# Patient Record
Sex: FEMALE | Race: BLACK OR AFRICAN AMERICAN | Employment: OTHER | URBAN - METROPOLITAN AREA
[De-identification: names, ages, dates, MRNs, and addresses within clinical notes are randomized per-mention and may not be internally consistent; named-entity substitution may affect disease eponyms.]

---

## 2017-02-10 ENCOUNTER — HOSPITAL ENCOUNTER (OUTPATIENT)
Dept: ONCOLOGY | Age: 82
Discharge: HOME | End: 2017-02-10

## 2017-02-10 ENCOUNTER — OFFICE VISIT (OUTPATIENT)
Dept: ONCOLOGY | Age: 82
End: 2017-02-10

## 2017-02-10 VITALS
HEIGHT: 63 IN | DIASTOLIC BLOOD PRESSURE: 79 MMHG | WEIGHT: 159 LBS | SYSTOLIC BLOOD PRESSURE: 162 MMHG | TEMPERATURE: 98.2 F | BODY MASS INDEX: 28.17 KG/M2 | HEART RATE: 67 BPM

## 2017-02-10 DIAGNOSIS — D47.1 CHRONIC MYELOPROLIFERATIVE DISORDER (HCC): ICD-10-CM

## 2017-02-10 DIAGNOSIS — D75.81 MYELOFIBROSIS (HCC): ICD-10-CM

## 2017-02-10 DIAGNOSIS — R53.82 CHRONIC FATIGUE: ICD-10-CM

## 2017-02-10 DIAGNOSIS — D64.9 ANEMIA, UNSPECIFIED TYPE: Primary | ICD-10-CM

## 2017-02-10 DIAGNOSIS — D64.9 ANEMIA, UNSPECIFIED TYPE: ICD-10-CM

## 2017-02-10 DIAGNOSIS — R63.0 ANOREXIA: ICD-10-CM

## 2017-02-10 LAB
BASOPHILS # BLD AUTO: 0 K/UL (ref 0–0.1)
BASOPHILS # BLD: 0 % (ref 0–2)
DIFFERENTIAL METHOD BLD: ABNORMAL
EOSINOPHIL # BLD: 0.1 K/UL (ref 0–0.4)
EOSINOPHIL NFR BLD: 2 % (ref 0–5)
ERYTHROCYTE [DISTWIDTH] IN BLOOD BY AUTOMATED COUNT: 14.8 % (ref 11.6–14.5)
HCT VFR BLD AUTO: 34.7 % (ref 35–45)
HGB BLD-MCNC: 11.5 G/DL (ref 12–16)
LYMPHOCYTES # BLD AUTO: 26 % (ref 21–52)
LYMPHOCYTES # BLD: 0.7 K/UL (ref 0.9–3.6)
MCH RBC QN AUTO: 26.3 PG (ref 24–34)
MCHC RBC AUTO-ENTMCNC: 33.1 G/DL (ref 31–37)
MCV RBC AUTO: 79.4 FL (ref 74–97)
MONOCYTES # BLD: 0.5 K/UL (ref 0.05–1.2)
MONOCYTES NFR BLD AUTO: 17 % (ref 3–10)
NEUTS SEG # BLD: 1.5 K/UL (ref 1.8–8)
NEUTS SEG NFR BLD AUTO: 55 % (ref 40–73)
PLATELET # BLD AUTO: 211 K/UL (ref 135–420)
PMV BLD AUTO: 10.6 FL (ref 9.2–11.8)
RBC # BLD AUTO: 4.37 M/UL (ref 4.2–5.3)
WBC # BLD AUTO: 2.8 K/UL (ref 4.6–13.2)

## 2017-02-10 RX ORDER — DRONABINOL 5 MG/1
5 CAPSULE ORAL 2 TIMES DAILY
Qty: 60 CAP | Refills: 3 | Status: SHIPPED | OUTPATIENT
Start: 2017-02-10

## 2017-02-10 NOTE — PROGRESS NOTES
Hematology/Oncology  Progress Note    Name: Mauro Galvan  Date: 2/10/2017  : 1935    PCP: Theresa Gracia MD     Ms. Erasto Mak is a [de-identified]year old female who was seen for management of her myelofibrosis. Current therapy: Procrit at a dose of 60,000 units subcutaneous whenever the hematocrit is below 30%. Subjective:     Mrs. Erasto Mak is a 69-year-old [facility] [place] woman who has a long-standing history of myelofibrosis. She also has slowly progressive Alzheimer's disease and is currently taking Aricept. I was informed by her daughter that her appetite has improved and she is gaining weight now. The patient is not complaining of any pain or discomfort. She reports that her bowel habits are regular. She has not noticed any blood in her urine or stool. She further denies having any shortness of breath, fever, or chills. The family is trying very hard to engage the patient with some form of regular exercise but they are continuing to meet some resistance.     Past Medical History   Diagnosis Date    Alzheimer's disease     Anemia     Anemia NEC     Cataract     Glaucoma     Heart murmur     HTN (hypertension)     Hyperlipidemia     Myelofibrosis (HCC)     OA (osteoarthritis)      lumbar spine    Osteopenia      Past Surgical History   Procedure Laterality Date    Hx hysterectomy       vaginal    Pr appendectomy       Social History     Social History    Marital status:      Spouse name: N/A    Number of children: N/A    Years of education: N/A     Occupational History    retired      Social History Main Topics    Smoking status: Never Smoker    Smokeless tobacco: Never Used    Alcohol use 0.5 oz/week     1 Glasses of wine per week      Comment: occassional    Drug use: No    Sexual activity: Not on file     Other Topics Concern    Not on file     Social History Narrative     Family History   Problem Relation Age of Onset    Cancer Mother      colon    Anemia Maternal Aunt  Osteoporosis Maternal Grandmother      Current Outpatient Prescriptions   Medication Sig Dispense Refill    iron polysacch complex-b12-fa (FERREX 150 FORTE) capsule Take 1 Cap by mouth daily. 30 Cap 2    citalopram (CELEXA) 20 mg tablet Take  by mouth daily.  ALPRAZolam (XANAX) 0.25 mg tablet Take 0.5-1 Tabs by mouth daily as needed. 30 Tab 0    citalopram (CELEXA) 10 mg tablet Take 1 Tab by mouth daily. 30 Tab 1    levothyroxine (SYNTHROID) 25 mcg tablet Take 1 Tab by mouth Daily (before breakfast). For Hypothyroidism (thyroid) 30 Tab 6    ezetimibe (ZETIA) 10 mg tablet Take 1 Tab by mouth daily. For cholesterol 90 Tab 3    donepezil (ARICEPT) 5 mg tablet Take 1 Tab by mouth nightly. For memory 90 Tab 3    amLODIPine (NORVASC) 5 mg tablet Take 1 Tab by mouth daily. 90 Tab 3    calcium-cholecalciferol, D3, (CALCIUM 600 + D) tablet Take 1 Tab by mouth daily. 90 Tab 3    multivitamin (ONE A DAY) tablet Take 1 Tab by mouth daily.  EPOETIN KAYLYN (PROCRIT IJ) by Injection route as needed. Review of Systems  Constitutional: The patient has no acute distress or discomfort. HEENT: The patient denies recent head trauma, eye pain, blurred vision,  hearing deficit, oropharyngeal mucosal pain or lesions, and the patient denies throat pain or discomfort. Lymphatics: The patient denies palpable peripheral lymphadenopathy. Hematologic: The patient denies having bruising, bleeding, or progressive fatigue. Respiratory: Patient denies having shortness of breath, cough, sputum production, fever, or dyspnea on exertion. Cardiovascular: The patient denies having leg pain, leg swelling, heart palpitations, chest permit, chest pain, or lightheadedness. The patient denies having dyspnea on exertion. Gastrointestinal: The patient denies having nausea, emesis, or diarrhea. The patient denies having any hematemesis or blood in the stool.   Genitourinary: Patient denies having urinary urgency, frequency, or dysuria. The patient denies having blood in the urine. Psychological: The patient denies having symptoms of nervousness, anxiety, depression, or thoughts of harming self. Skin: Patient denies having skin rashes, skin, ulcerations, or unexplained itching or pruritus. Musculoskeletal: The patient denies having pain in the joints or bones. Neurologic: The patient has slowly progressive Alzheimer's disease. Objective:     Visit Vitals    /79    Pulse 67    Temp 98.2 °F (36.8 °C)    Ht 5' 3\" (1.6 m)    Wt 72.1 kg (159 lb)    BMI 28.17 kg/m2     ECOG PS=1-2     Physical Exam:   Gen. Appearance: The patient is in no acute distress. Skin: There is no bruise or rash. HEENT: The exam is unremarkable. Neck: Supple without lymphadenopathy or thyromegaly. Lungs: Clear to auscultation and percussion; there are no wheezes or rhonchi. Heart: Regular rate and rhythm; there are no murmurs, gallops, or rubs. Anterior chest wall. Left breast: Deferred. Abdomen: Bowel sounds are present and normal.  There is no guarding, tenderness, or hepatosplenomegaly. Extremities: There is no clubbing, cyanosis, or edema. Neurologic: There are no focal neurologic deficits. Lymphatics: There is no palpable peripheral lymphadenopathy. Musculoskeletal: The patient has full range of motion at all joints. There is no evidence of joint deformity or effusions. There is no focal joint tenderness. Neurologic: The patient has slowly progressive Alzheimer's disease  Psychological/psychiatric: There is no clinical evidence of anxiety, depression, or melancholy. Lab data:  CBC from today shows that her WBC count of 3.1, hemoglobin is 11.5 g/dL, hematocrit is 35.5%, and platelet count is 082,753.       Results for orders placed or performed during the hospital encounter of 09/12/14   CBC WITH 3 PART DIFF     Status: Abnormal   Result Value Ref Range Status    WBC 3.1 (L) 4.5 - 13.0 K/uL Final    RBC 4.22 4.10 - 5.10 M/uL Final    HGB 11.2 (L) 12.0 - 16.0 g/dL Final    HCT 33.7 (L) 36 - 48 % Final    MCV 79.9 78 - 102 FL Final    MCH 26.5 25.0 - 35.0 PG Final    MCHC 33.2 31 - 37 g/dL Final    RDW 15.4 (H) 11.5 - 14.5 % Final    PLATELET 964 042 - 759 K/uL Final    NEUTROPHILS 55 40 - 70 % Final    MIXED CELLS 16 0.1 - 17 % Final    LYMPHOCYTES 29 14 - 44 % Final    ABS. NEUTROPHILS 1.7 (L) 1.8 - 9.5 K/UL Final    ABS. MIXED CELLS 0.5 0.0 - 2.3 K/uL Final    ABS. LYMPHOCYTES 0.9 (L) 1.1 - 5.9 K/UL Final     Comment: Test Performed by [facility]. Results reviewed by Medical Director. DF AUTOMATED   Final           Assessment:     1. Anemia, unspecified type    2. Chronic myeloproliferative disorder ([facility]    3. Anorexia    4. Myelofibrosis ([facility]    5. Chronic fatigue      Plan:   Chronic myeloproliferative disorder/Anemia and myelofibrosis: The patient has slowly progressive myelofibrosis and associated anemia. I have informed the patient that the CBC from today,  revealed a mild leukopenia with a WBC count of 3.1, hemoglobin 11.5 g/dL, hematocrit is 35.5%, and the platelet count is 678,443. Recent flow cytometry done on 4/1/2016 did not show any evidence of immunophenotypic abnormality. We will continue to monitor the patient every 12 weeks and if her hemoglobin should decline below 30% she will be treated with  a dose of Procrit 60,000 units subcutaneous every 2 weeks. Alzheimer's disease: The patient's family is providing a significant level of support and oversight. She will continue with her daily Aricept  She is also going to a [employer], on a daily basis as part of her treatment strategy. Chronic fatigue: patient denies any fatigue at present. The patient and her family will continue the mild exercise program such as walking for short distances multiple times during the day to stimulate her metabolism. Anorexia: The patient weight is stable now.  I have also recommended that the patient continue using the nutritional supplements such as Ensure or boost  Once or  twice daily. At this time a comprehensive metabolic panel, iron profile, ferritin level. I will have her return to the clinic for a complete reassessment again in 12 weeks.   Orders Placed This Encounter    COMPLETE CBC & AUTO DIFF WBC    InHouse CBC ([facility])     Standing Status:   Future     Number of Occurrences:   1     Standing Expiration Date:   2/17/2017       Pasadenamynor Feldman MD  2/10/2017

## 2017-02-10 NOTE — MR AVS SNAPSHOT
Visit Information Date & Time Provider Department Dept. Phone Encounter #  
 2/10/2017  1:15 PM [facility] Office [phone] [id number] Follow-up Instructions Return in about 3 months (around 5/10/2017). Your Appointments 5/12/2017  1:15 PM  
Office Visit with Ricarda Brunner MD  
[facility] [address] Appt Note: [address] [address]  
[phone]  
  
   
 [facility] [address] [place] [address] Upcoming Health Maintenance Date Due DTaP/Tdap/Td series (1 - Tdap) 12/24/1957 ZOSTER VACCINE AGE 60> 12/24/1996 GLAUCOMA SCREENING Q2Y 12/24/2001 Pneumococcal 65+ Low/Medium Risk (1 of 2 - PCV13) 12/24/2001 MEDICARE YEARLY EXAM 12/24/2001 INFLUENZA AGE 9 TO ADULT 8/1/2016 Allergies as of 2/10/2017  Review Complete On: 10/16/2016 By: Ricarda Brunner MD  
  
 Severity Noted Reaction Type Reactions Aspirin  06/09/2010    Other (comments) Upset stomach Crestor [Rosuvastatin]  06/09/2010    Unknown (comments) Namenda [Memantine]  06/09/2010    Unknown (comments) Zocor [Simvastatin]  06/09/2010    Other (comments) Cramps, weakness Current Immunizations  Never Reviewed Name Date H1N1 FLU VACCINE 11/2/2009 Influenza Vaccine Whole 9/1/2009 Not reviewed this visit You Were Diagnosed With   
  
 Codes Comments Anemia, unspecified type    -  Primary ICD-10-CM: D64.9 ICD-9-CM: 660. 9 Chronic myeloproliferative disorder (HCC)     ICD-10-CM: D47.1 ICD-9-CM: 238.79 Anorexia     ICD-10-CM: R63.0 ICD-9-CM: 783.0 Myelofibrosis ([facility]     ICD-10-CM: W19.65 ICD-9-CM: 289.83 Chronic fatigue     ICD-10-CM: R53.82 
ICD-9-CM: 780.79 Vitals BP Pulse Temp Height(growth percentile) Weight(growth percentile) BMI  
 162/79 67 98.2 °F (36.8 °C) 5' 3\" (1.6 m) 159 lb (72.1 kg) 28.17 kg/m2 OB Status Smoking Status Hysterectomy Never Smoker BMI and BSA Data Body Mass Index Body Surface Area  
 28.17 kg/m 2 1.79 m 2 Preferred Pharmacy Pharmacy Name [facility] [address], [address] [phone] Your Updated Medication List  
  
   
This list is accurate as of: 2/10/17  2:50 PM.  Always use your most recent med list.  
  
  
  
  
 ALPRAZolam 0.25 mg tablet Commonly known as:  Rebbeca Lawn Take 0.5-1 Tabs by mouth daily as needed. amLODIPine 5 mg tablet Commonly known as:  Mauro Ruths Take 1 Tab by mouth daily. calcium-cholecalciferol (D3) tablet Commonly known as:  Calcium 600 + D Take 1 Tab by mouth daily. * CeleXA 20 mg tablet Generic drug:  citalopram  
Take  by mouth daily. * citalopram 10 mg tablet Commonly known as:  Jia Sol Take 1 Tab by mouth daily. donepezil 5 mg tablet Commonly known as:  ARICEPT Take 1 Tab by mouth nightly. For memory  
  
 dronabinol 5 mg capsule Commonly known as:  Shazia Martinis Take 1 Cap by mouth two (2) times a day. Max Daily Amount: 10 mg.  
  
 ezetimibe 10 mg tablet Commonly known as:  Jarome Dys Take 1 Tab by mouth daily. For cholesterol  
  
 iron polysacch complex-b12-fa capsule Commonly known as:  [address] Take 1 Cap by mouth daily. levothyroxine 25 mcg tablet Commonly known as:  synthroid Take 1 Tab by mouth Daily (before breakfast). For Hypothyroidism (thyroid)  
  
 multivitamin tablet Commonly known as:  ONE A DAY Take 1 Tab by mouth daily. PROCRIT INJECTION  
by Injection route as needed. * Notice: This list has 2 medication(s) that are the same as other medications prescribed for you. Read the directions carefully, and ask your doctor or other care provider to review them with you. Prescriptions Printed  Refills  
 dronabinol (MARINOL) 5 mg capsule 3  
 Sig: Take 1 Cap by mouth two (2) times a day. Max Daily Amount: 10 mg.  
 Class: Print Route: Oral  
  
We Performed the Following COMPLETE CBC & AUTO DIFF WBC [20230 CPT(R)] Follow-up Instructions Return in about 3 months (around 5/10/2017). To-Do List   
 02/10/2017 Lab:  CBC WITH 3 PART DIFF Introducing [facility]! Dear Royce Cox: 
Thank you for requesting a "[employer] account. Our records indicate that you have previously registered for a "[employer] account but its currently inactive. Please call our "[employer] support line at [phone]. Additional Information If you have questions, please visit the Frequently Asked Questions section of the "[employer] website at [email or web address] Remember, "[employer] is NOT to be used for urgent needs. For medical emergencies, dial 911. Now available from your iPhone and Android! Please provide this summary of care documentation to your next provider. Your primary care clinician is listed as LAURYN JACKSON. If you have any questions after today's visit, please call [phone].

## 2017-07-17 ENCOUNTER — HOSPITAL ENCOUNTER (OUTPATIENT)
Dept: PHYSICAL THERAPY | Age: 82
End: 2017-07-17

## 2017-08-01 ENCOUNTER — HOSPITAL ENCOUNTER (OUTPATIENT)
Dept: PHYSICAL THERAPY | Age: 82
Discharge: HOME | End: 2017-08-01
Payer: MEDICARE

## 2017-08-01 PROCEDURE — G8978 MOBILITY CURRENT STATUS: HCPCS

## 2017-08-01 PROCEDURE — G8981 BODY POS CURRENT STATUS: HCPCS

## 2017-08-01 PROCEDURE — G8979 MOBILITY GOAL STATUS: HCPCS

## 2017-08-01 PROCEDURE — 97110 THERAPEUTIC EXERCISES: CPT

## 2017-08-01 PROCEDURE — G8982 BODY POS GOAL STATUS: HCPCS

## 2017-08-01 PROCEDURE — 97162 PT EVAL MOD COMPLEX 30 MIN: CPT

## 2017-08-01 NOTE — PROGRESS NOTES
PT DAILY TREATMENT NOTE - [facility]     Patient Name: Shayy Schmidt  Date:2017  : 1935  [x]  Patient  Verified  [place] / Plan: [place] MED[place]ARE PART A & B / Product Type: Medicare /    In time:500  Out time:530  Total Treatment Time (min): 30  Total Timed Codes (min): 10  1:1 Treatment Time ( [address] only): 28  Visit #: 1 of 8    Treatment Area: Low back pain [M54.5]    SUBJECTIVE  Pain Level (0-10 scale): 3  Any medication changes, allergies to medications, adverse drug reactions, diagnosis change, or new procedure performed?: [x] No    [] Yes (see summary sheet for update)  Subjective functional status:   [x] See Eval form in paper chart      OBJECTIVE    20 min [x]Eval                  []Re-Eval     10 min Therapeutic Exercise:  [x] See HEP  sheet :   Rationale: increase ROM, increase strength, improve coordination and increase proprioception to improve the patients ability to regain core control, pelvic mobility and flexibility for increased adls                    With   [] TE   [] TA   [] neuro   [] other: Patient Education: [x] Review HEP    [] Progressed/Changed HEP based on:   [] positioning   [] body mechanics   [] transfers   [] heat/ice application    [] other:                  Pain Level (0-10 scale) post treatment: 3    ASSESSMENT:   [x]  See Evaluation         Goals:  Short Term Goals: To be accomplished in 1 weeks:  1. Establish and comply with HEP     Long Term Goals: To be accomplished in 8 treatments:  1. Pt will demonstrate understanding/compliance with final HEP in order to continue to improve independently upon DC   (Status at evaluation: na)   2. Patient will Increase FOTO score to 70 points to demonstrate clinical significant increase allowing increased functional mobility within the home and community. (Status at evaluation: 50)   3.  Patient will report <3/10 pain with mobility to increase functional activity tolerance, progress walking program and household chores  (Status at evaluation: 3-10/10)   4.  Patient will begin 15 minute walking or biking program to aid in back health and carryover beyond discharge, three times a week with assistance of family activity level  (Status at evaluation: decreased overall function )     PLAN      [x]  Continue plan of care    []  Other:Roderick Mercado, PT 8/1/2017  5:53 PM

## 2017-08-01 NOTE — PROGRESS NOTES
[facility] - [facility]  [address], [address] [phone] fax    Plan of Care/ Statement of Necessity for Physical Therapy Services    Patient name: Homero Blizzard Start of Care: 2017   Referral source: Silva Redman MD : 1935    Medical Diagnosis: Low back pain [M54.5]   Onset Date:July     Treatment Diagnosis: low back pain   Prior Hospitalization: see medical history Provider#: [id number]   Medications: Verified on Patient summary List    Comorbidities: chronic anemia (per EMR Chronic myeloproliferative disorder ([facility]); htn, thyroid, dimentia   Prior Level of Function: Lives with  and youngest son, daughter assists with scheduling appt; used to walk with  but is not at this time, sings in choir, no ad         The Plan of Care and following information is based on the information from the initial evaluation. Assessment/ key information: Patient is a [de-identified] y. o.female presenting with Low back pain [M54.5]. Mrs. Jing Scott is a very pleasant and cooperative woman, brought to her appointment by her daughter and . She reports that she has always had this back pain, perseverating on never having had stomach contractions with labor, always just back pain and not understanding how I can help with that. She is A&O x 4, but much of her history and deferral of questions suggests that she has quite a bit of difficulty with memory. Back pain is central low back, limitations are noted more to the left. Evaluation reveals good overall mobility, however she is very tight through trunk and L.R hips. We will work to address this stiffness, improve core support and progress functional activity to allow carryover to home and beyond discharge.   I suspect she may have a hard time incorporating this into her routine, but her family appears supportive and motivated to helpPatient will benefit from skilled PT services to address deficits and facilitate return to premorbid activity level and promote improved quality of life. Evaluation Complexity History MEDIUM  Complexity : 1-2 comorbidities / personal factors will impact the outcome/ POC ; Examination MEDIUM Complexity : 3 Standardized tests and measures addressing body structure, function, activity limitation and / or participation in recreation  ;Presentation HIGH Complexity : Unstable and unpredictable characteristics  ; Clinical Decision Making MEDIUM Complexity : FOTO score of 26-74  Overall Complexity Rating: MEDIUM  Problem List: pain affecting function, decrease ROM, decrease strength, decrease ADL/ functional abilitiies, decrease activity tolerance, decrease flexibility/ joint mobility and decrease transfer abilities   Treatment Plan may include any combination of the following: Therapeutic exercise, Therapeutic activities, Neuromuscular re-education, Physical agent/modality, Gait/balance training, Manual therapy, Patient education, Self Care training, Functional mobility training, Home safety training and Stair training  Patient / Family readiness to learn indicated by: asking questions, trying to perform skills and interest  Persons(s) to be included in education: patient (P)/Family as available  Barriers to Learning/Limitations: yes;  cognitive  Patient Goal (s): more flexibility and decreased discomfort  Patient Self Reported Health Status: good  Rehabilitation Potential: good  Short Term Goals: To be accomplished in 1 weeks:  1. Establish and comply with HEP     Long Term Goals: To be accomplished in 8 treatments:  1. Pt will demonstrate understanding/compliance with final HEP in order to continue to improve independently upon DC   (Status at evaluation: na)   2. Patient will Increase FOTO score to 70 points to demonstrate clinical significant increase allowing increased functional mobility within the home and community. (Status at evaluation: 50)   3.  Patient will report <3/10 pain with mobility to increase functional activity tolerance, progress walking program and household chores  (Status at evaluation: 3-10/10)   4. Patient will begin 15 minute walking or biking program to aid in back health and carryover beyond discharge, three times a week with assistance of family activity level  (Status at evaluation: decreased overall function )     Frequency / Duration: Patient to be seen 2 times per week for 8 treatments. Patient/ Caregiver education and instruction: Diagnosis, prognosis, self care, activity modification and exercises   [x]  Plan of care has been reviewed with JACLYN    G-Codes (GP)    Position   Current  CK= 40-59%   Goal  CJ= 20-39%      The severity rating is based on clinical judgment and the FOTO score. Certification Period: 8/1/17-8/30/17  Jovan Butts, PT 8/1/2017 5:44 PM    ________________________________________________________________________    I certify that the above Therapy Services are being furnished while the patient is under my care. I agree with the treatment plan and certify that this therapy is necessary.     Physician's Signature:____________________  Date:____________Time: _________    Please sign and return to [facility] - [address]   [facility], [address] [phone] fax

## 2017-08-01 NOTE — MR AVS SNAPSHOT
Visit Information Date & Time Provider Department Dept. Phone Encounter #  
 8/1/2017  5:00 PM Matra Bradley, PT [facility] [address] [address]  [phone] [id number] Upcoming Health Maintenance Date Due DTaP/Tdap/Td series (1 - Tdap) 12/24/1957 ZOSTER VACCINE AGE 60> 10/24/1996 GLAUCOMA SCREENING Q2Y 12/24/2001 Pneumococcal 65+ Low/Medium Risk (1 of 2 - PCV13) 12/24/2001 MEDICARE YEARLY EXAM 12/24/2001 INFLUENZA AGE 9 TO ADULT 8/1/2017 Allergies as of 8/1/2017  Review Complete On: 2/25/2017 By: Anival Rodriguez MD  
  
 Severity Noted Reaction Type Reactions Aspirin  06/09/2010    Other (comments) Upset stomach Crestor [Rosuvastatin]  06/09/2010    Unknown (comments) Namenda [Memantine]  06/09/2010    Unknown (comments) Zocor [Simvastatin]  06/09/2010    Other (comments) Cramps, weakness Current Immunizations  Never Reviewed Name Date H1N1 FLU VACCINE 11/2/2009 Influenza Vaccine Whole 9/1/2009 Not reviewed this visit Vitals OB Status Smoking Status Hysterectomy Never Smoker Your Updated Medication List  
  
ASK your doctor about these medications ALPRAZolam 0.25 mg tablet Commonly known as:  Julio Cesar Crater Take 0.5-1 Tabs by mouth daily as needed. amLODIPine 5 mg tablet Commonly known as:  James Az Take 1 Tab by mouth daily. calcium-cholecalciferol (D3) tablet Commonly known as:  Calcium 600 + D Take 1 Tab by mouth daily. * CeleXA 20 mg tablet Generic drug:  citalopram  
Take  by mouth daily. * citalopram 10 mg tablet Commonly known as:  Griselda Terrie Take 1 Tab by mouth daily. donepezil 5 mg tablet Commonly known as:  ARICEPT Take 1 Tab by mouth nightly. For memory  
  
 dronabinol 5 mg capsule Commonly known as:  Beth Net Take 1 Cap by mouth two (2) times a day. Max Daily Amount: 10 mg.  
  
 ezetimibe 10 mg tablet Commonly known as:  Harriett Slim Take 1 Tab by mouth daily. For cholesterol  
  
 iron polysacch complex-b12-fa capsule Commonly known as:  [address] Take 1 Cap by mouth daily. levothyroxine 25 mcg tablet Commonly known as:  synthroid Take 1 Tab by mouth Daily (before breakfast). For Hypothyroidism (thyroid)  
  
 multivitamin tablet Commonly known as:  ONE A DAY Take 1 Tab by mouth daily. PROCRIT INJECTION  
by Injection route as needed. * Notice: This list has 2 medication(s) that are the same as other medications prescribed for you. Read the directions carefully, and ask your doctor or other care provider to review them with you. To-Do List   
 08/01/2017  5:00 PM  
  Appointment with Jenna Velasco PT at [facility] PT [address] [place] [address] ([phone]) Introducing [facility]! Dear Barbara Noel: 
Thank you for requesting a No[employer] account. Our records indicate that you have previously registered for a NoiseFree account but its currently inactive. Please call our NoiseFree support line at [phone]. Additional Information If you have questions, please visit the Frequently Asked Questions section of the NoiseFree website at [email or web address] Remember, NoiseFree is NOT to be used for urgent needs. For medical emergencies, dial 911. Now available from your iPhone and Android! Please provide this summary of care documentation to your next provider. Your primary care clinician is listed as LAURYN JACKSON. If you have any questions after today's visit, please call [phone].

## 2017-08-03 ENCOUNTER — HOSPITAL ENCOUNTER (OUTPATIENT)
Dept: PHYSICAL THERAPY | Age: 82
End: 2017-08-03
Payer: MEDICARE

## 2017-08-09 ENCOUNTER — APPOINTMENT (OUTPATIENT)
Dept: PHYSICAL THERAPY | Age: 82
End: 2017-08-09
Payer: MEDICARE

## 2017-08-11 ENCOUNTER — HOSPITAL ENCOUNTER (OUTPATIENT)
Dept: PHYSICAL THERAPY | Age: 82
Discharge: HOME | End: 2017-08-11
Payer: MEDICARE

## 2017-08-11 ENCOUNTER — APPOINTMENT (OUTPATIENT)
Dept: PHYSICAL THERAPY | Age: 82
End: 2017-08-11
Payer: MEDICARE

## 2017-08-11 PROCEDURE — 97110 THERAPEUTIC EXERCISES: CPT

## 2017-08-11 PROCEDURE — 97112 NEUROMUSCULAR REEDUCATION: CPT

## 2017-08-11 NOTE — PROGRESS NOTES
PT DAILY TREATMENT NOTE - [facility]     Patient Name: Lila Ray  Date:2017  : 1935  [x]  Patient  Verified  Payor: Markie Ch / Plan: [place] PART [place] & B / Product Type: Medicare /    In time:212  Out time:300  Total Treatment Time (min): 48  Total Timed Codes (min): 35  1:1 Treatment Time ( only): 35   Visit #: 2 of 8    Treatment Area: Low back pain [M54.5]    SUBJECTIVE  Pain Level (0-10 scale): 5  Any medication changes, allergies to medications, adverse drug reactions, diagnosis change, or new procedure performed?: [x] No    [] Yes (see summary sheet for update)  Subjective functional status/changes:   [] No changes reported  Its the same as always, ever since I had those 6 kids with back labor.     When asked if she had done ex she couldn't remember, and her  said he hadn't gotten muc cooperation     OBJECTIVE    Modality rationale: decrease inflammation, decrease pain and increase tissue extensibility to improve the patients ability to regain ease of mobiilty    Min Type Additional Details    [] Estim:  []Unatt       []IFC  []Premod                        []Other:  []w/ice   []w/heat  Position:  Location:    [] Estim: []Att    []TENS instruct  []NMES                    []Other:  []w/US   []w/ice   []w/heat  Position:  Location:    []  Traction: [] Cervical       []Lumbar                       [] Prone          []Supine                       []Intermittent   []Continuous Lbs:  [] before manual  [] after manual    []  Ultrasound: []Continuous   [] Pulsed                           []1MHz   []3MHz W/cm2:  Location:    []  Iontophoresis with dexamethasone         Location: [] Take home patch   [] In clinic   10 []  Ice     [x]  heat  []  Ice massage  []  Laser   []  Anodyne Position:seated  Location:low back     []  Laser with stim  []  Other:  Position:  Location:    []  Vasopneumatic Device Pressure:       [] lo [] med [] hi   Temperature: [] lo [] med [] hi   [] Skin assessment post-treatment:  []intact []redness- no adverse reaction    []redness - adverse reaction:         12 min Therapeutic Exercise:  [] See flow sheet :  Began with stepper, self stretches   Rationale: increase ROM, increase strength, improve coordination and increase proprioception to improve the patients ability to regain core control, pelvic mobility and flexibility for increased adls           23 min Neuromuscular Re-education:  [x]  See flow sheet :  Pelvic mobility and TA bracing exercises    Rationale: increase ROM, increase strength, improve coordination, improve balance and increase proprioception  to improve the patients ability to regain ease of mobility, allow self and family supported progression of overall activity            With   [] TE   [] TA   [x] neuro   [] other: Patient Education: [x] Review HEP    [] Progressed/Changed HEP based on:   [] positioning   [] body mechanics   [] transfers   [] heat/ice application    [] other:      Other Objective/Functional Measures:      Pain Level (0-10 scale) post treatment: 5    ASSESSMENT/Changes in Function: Patient needs cues for proper posture, does fairly well with exercise but fatigues easily and a lot of sighing with activity. Likely poor carryover    Patient will continue to benefit from skilled PT services to modify and progress therapeutic interventions, address functional mobility deficits, address ROM deficits, address strength deficits, analyze and address soft tissue restrictions, analyze and cue movement patterns, analyze and modify body mechanics/ergonomics, assess and modify postural abnormalities, address imbalance/dizziness and instruct in home and community integration to attain remaining goals. []  See Plan of Care  []  See progress note/recertification  []  See Discharge Summary         Progress towards goals / Updated goals:  Short Term Goals: To be accomplished in 1 weeks:  1.   Establish and comply with HEP    Provided, not met   Long Term Goals: To be accomplished in 8 treatments:  1. Pt will demonstrate understanding/compliance with final HEP in order to continue to improve independently upon DC                   (Status at evaluation: na)   CURRENT: not met     2. Patient will Increase FOTO score to 70 points to demonstrate clinical significant increase allowing increased functional mobility within the home and community. (Status at evaluation: 50)   CURRENT:at 30 day    3. Patient will report <3/10 pain with mobility to increase functional activity tolerance, progress walking program and household chores  (Status at evaluation: 3-10/10)   CURRENT:  Not met 4.  Patient will begin 15 minute walking or biking program to aid in back health and carryover beyond discharge, three times a week with assistance of family activity level  (Status at evaluation: decreased overall function )   CURRENT:not met       PLAN  []  Upgrade activities as tolerated     [x]  Continue plan of care  []  Update interventions per flow sheet       []  Discharge due to:_  []  Other:_      Ailyn Pulido PT 8/11/2017  1:10 PM    Future Appointments  Date Time Provider Han Gtz   8/11/2017 2:00 PM Ailyn Pulido PT [facility]

## 2017-08-16 ENCOUNTER — APPOINTMENT (OUTPATIENT)
Dept: PHYSICAL THERAPY | Age: 82
End: 2017-08-16
Payer: MEDICARE

## 2017-08-18 ENCOUNTER — APPOINTMENT (OUTPATIENT)
Dept: PHYSICAL THERAPY | Age: 82
End: 2017-08-18
Payer: MEDICARE

## 2017-08-21 ENCOUNTER — APPOINTMENT (OUTPATIENT)
Dept: PHYSICAL THERAPY | Age: 82
End: 2017-08-21
Payer: MEDICARE

## 2017-08-23 ENCOUNTER — APPOINTMENT (OUTPATIENT)
Dept: PHYSICAL THERAPY | Age: 82
End: 2017-08-23
Payer: MEDICARE

## 2017-08-24 ENCOUNTER — APPOINTMENT (OUTPATIENT)
Dept: PHYSICAL THERAPY | Age: 82
End: 2017-08-24
Payer: MEDICARE

## 2017-08-25 ENCOUNTER — APPOINTMENT (OUTPATIENT)
Dept: PHYSICAL THERAPY | Age: 82
End: 2017-08-25
Payer: MEDICARE

## 2017-08-28 ENCOUNTER — APPOINTMENT (OUTPATIENT)
Dept: PHYSICAL THERAPY | Age: 82
End: 2017-08-28
Payer: MEDICARE

## 2017-08-31 ENCOUNTER — APPOINTMENT (OUTPATIENT)
Dept: PHYSICAL THERAPY | Age: 82
End: 2017-08-31
Payer: MEDICARE

## 2017-09-08 NOTE — PROGRESS NOTES
[facility] - [place] [address]  [address], [address] [phone] fax    Discharge Summary  Patient name: Maureen Bailey Start of Care: 2017   Referral source: Erlinda Simeon MD : 1935                         Medical Diagnosis: Low back pain [M54.5] Onset Date:July                         Treatment Diagnosis: low back pain   Prior Hospitalization: see medical history Provider#: [id number]   Medications: Verified on Patient summary List    Comorbidities: chronic anemia (per EMR Chronic myeloproliferative disorder ([address]); htn, thyroid, dimentia   Prior Level of Function: Lives with  and youngest son, daughter assists with scheduling appt; used to walk with  but is not at this time, sings in choir, no ad      Visits from Start of Care: 2    Missed Visits: 2  Reporting Period : 17 to 17          Assessment/Summary of care: Patient last seen for PT on 17, at which time the following assessment was made:  \"Patient needs cues for proper posture, does fairly well with exercise but fatigues easily and a lot of sighing with activity. Likely poor carryover  Short Term Goals: To be accomplished in 1 weeks:  1.  Establish and comply with HEP    Provided, not met   Long Term Goals: To be accomplished in 8 treatments:  1. Pt will demonstrate understanding/compliance with final HEP in order to continue to improve independently upon DC                   (Status at evaluation: na)   CURRENT: not met      2. Patient will Increase FOTO score to 70 points to demonstrate clinical significant increase allowing increased functional mobility within the home and community. (Status at evaluation: 50)   CURRENT:at 30 day     3. Patient will report <3/10 pain with mobility to increase functional activity tolerance, progress walking program and household chores  (Status at evaluation: 3-10/10)   CURRENT:  Not met 4.  Patient will begin 15 minute walking or biking program to aid in back health and carryover beyond discharge, three times a week with assistance of family activity level  (Status at evaluation: decreased overall function )   CURRENT:not met \"    Patient's daughter was working with OM to schedule appointments, which proved to be challenging, and ultimately requested discharge at this time. Minimal progress was noted in 2 visits, likely limited d/t memory deficits and degenerative changes. Goals unmet, present status unknown. Thank you for this referral.        G-Codes (GP)  Not assessed as this was an unplanned discharge.      RECOMMENDATIONS:  [x]Discontinue therapy: []Patient has reached or is progressing toward set goals      [x]Patient is non-compliant or has abdicated      []Due to lack of appreciable progress towards set goals    Sommer Echavarria, PT 9/8/2017 2:20 PM

## 2018-06-12 ENCOUNTER — HOSPITAL ENCOUNTER (OUTPATIENT)
Dept: CT IMAGING | Age: 83
Discharge: HOME | End: 2018-06-12
Attending: INTERNAL MEDICINE
Payer: MEDICARE

## 2018-06-12 DIAGNOSIS — R06.02 SHORTNESS OF BREATH: ICD-10-CM

## 2018-06-12 LAB — CREAT UR-MCNC: 0.7 MG/DL (ref 0.6–1.3)

## 2018-06-12 PROCEDURE — 74011636320 HC RX REV CODE- 636/320: Performed by: INTERNAL MEDICINE

## 2018-06-12 PROCEDURE — 82565 ASSAY OF CREATININE: CPT

## 2018-06-12 PROCEDURE — 71260 CT THORAX DX C+: CPT

## 2018-06-12 RX ADMIN — IOPAMIDOL 75 ML: 612 INJECTION, SOLUTION INTRAVENOUS at 14:16

## 2018-06-19 ENCOUNTER — HOSPITAL ENCOUNTER (OUTPATIENT)
Dept: ONCOLOGY | Age: 83
Discharge: HOME | End: 2018-06-19

## 2018-06-19 ENCOUNTER — OFFICE VISIT (OUTPATIENT)
Dept: ONCOLOGY | Age: 83
End: 2018-06-19

## 2018-06-19 ENCOUNTER — HOSPITAL ENCOUNTER (OUTPATIENT)
Dept: LAB | Age: 83
Discharge: HOME | End: 2018-06-19
Payer: MEDICARE

## 2018-06-19 VITALS
HEART RATE: 68 BPM | BODY MASS INDEX: 30.65 KG/M2 | DIASTOLIC BLOOD PRESSURE: 75 MMHG | TEMPERATURE: 98.1 F | WEIGHT: 173 LBS | SYSTOLIC BLOOD PRESSURE: 136 MMHG

## 2018-06-19 DIAGNOSIS — D47.1 CHRONIC MYELOPROLIFERATIVE DISORDER (HCC): Primary | ICD-10-CM

## 2018-06-19 DIAGNOSIS — D47.1 CHRONIC MYELOPROLIFERATIVE DISORDER (HCC): ICD-10-CM

## 2018-06-19 DIAGNOSIS — D75.81: ICD-10-CM

## 2018-06-19 DIAGNOSIS — D64.9 ANEMIA, UNSPECIFIED TYPE: ICD-10-CM

## 2018-06-19 DIAGNOSIS — R53.82 CHRONIC FATIGUE: ICD-10-CM

## 2018-06-19 DIAGNOSIS — D75.81 MYELOFIBROSIS (HCC): ICD-10-CM

## 2018-06-19 LAB
ALBUMIN SERPL-MCNC: 3.4 G/DL (ref 3.4–5)
ALBUMIN/GLOB SERPL: 0.9 {RATIO} (ref 0.8–1.7)
ALP SERPL-CCNC: 76 U/L (ref 45–117)
ALT SERPL-CCNC: 19 U/L (ref 13–56)
ANION GAP SERPL CALC-SCNC: 8 MMOL/L (ref 3–18)
AST SERPL-CCNC: 16 U/L (ref 15–37)
BASOPHILS # BLD: 0 K/UL (ref 0–0.1)
BASOPHILS NFR BLD: 1 % (ref 0–2)
BILIRUB SERPL-MCNC: 0.3 MG/DL (ref 0.2–1)
BUN SERPL-MCNC: 16 MG/DL (ref 7–18)
BUN/CREAT SERPL: 20 (ref 12–20)
CALCIUM SERPL-MCNC: 8.5 MG/DL (ref 8.5–10.1)
CHLORIDE SERPL-SCNC: 100 MMOL/L (ref 100–108)
CO2 SERPL-SCNC: 29 MMOL/L (ref 21–32)
CREAT SERPL-MCNC: 0.79 MG/DL (ref 0.6–1.3)
DIFFERENTIAL METHOD BLD: ABNORMAL
EOSINOPHIL # BLD: 0 K/UL (ref 0–0.4)
EOSINOPHIL NFR BLD: 1 % (ref 0–5)
ERYTHROCYTE [DISTWIDTH] IN BLOOD BY AUTOMATED COUNT: 14.8 % (ref 11.6–14.5)
FERRITIN SERPL-MCNC: 385 NG/ML (ref 8–388)
GLOBULIN SER CALC-MCNC: 3.9 G/DL (ref 2–4)
GLUCOSE SERPL-MCNC: 83 MG/DL (ref 74–99)
HCT VFR BLD AUTO: 34.6 % (ref 35–45)
HGB BLD-MCNC: 11.7 G/DL (ref 12–16)
IRON SATN MFR SERPL: 38 %
IRON SERPL-MCNC: 91 UG/DL (ref 50–175)
LYMPHOCYTES # BLD: 0.9 K/UL (ref 0.9–3.6)
LYMPHOCYTES NFR BLD: 31 % (ref 21–52)
MCH RBC QN AUTO: 25.9 PG (ref 24–34)
MCHC RBC AUTO-ENTMCNC: 33.8 G/DL (ref 31–37)
MCV RBC AUTO: 76.5 FL (ref 74–97)
MONOCYTES # BLD: 0.5 K/UL (ref 0.05–1.2)
MONOCYTES NFR BLD: 19 % (ref 3–10)
NEUTS SEG # BLD: 1.4 K/UL (ref 1.8–8)
NEUTS SEG NFR BLD: 48 % (ref 40–73)
PLATELET # BLD AUTO: 216 K/UL (ref 135–420)
PMV BLD AUTO: 10.6 FL (ref 9.2–11.8)
POTASSIUM SERPL-SCNC: 4.1 MMOL/L (ref 3.5–5.5)
PROT SERPL-MCNC: 7.3 G/DL (ref 6.4–8.2)
RBC # BLD AUTO: 4.52 M/UL (ref 4.2–5.3)
SODIUM SERPL-SCNC: 137 MMOL/L (ref 136–145)
TIBC SERPL-MCNC: 240 UG/DL (ref 250–450)
WBC # BLD AUTO: 2.8 K/UL (ref 4.6–13.2)

## 2018-06-19 PROCEDURE — 82728 ASSAY OF FERRITIN: CPT | Performed by: INTERNAL MEDICINE

## 2018-06-19 PROCEDURE — 36415 COLL VENOUS BLD VENIPUNCTURE: CPT | Performed by: INTERNAL MEDICINE

## 2018-06-19 PROCEDURE — 80053 COMPREHEN METABOLIC PANEL: CPT | Performed by: INTERNAL MEDICINE

## 2018-06-19 PROCEDURE — 83540 ASSAY OF IRON: CPT | Performed by: INTERNAL MEDICINE

## 2018-06-19 NOTE — PROGRESS NOTES
Hematology/Oncology  Progress Note    Name: Zachery Lacey  Date: 2018  : 1935    PCP: Oni Jordan MD     Ms. Lucille Acosta is a 81year old female who was seen for management of her myelofibrosis. Current therapy: Procrit at a dose of 60,000 units subcutaneous whenever the hematocrit is below 30%. Subjective:     Mrs. Lucille Acosta is a 81-year-old [facility] [place] woman who has a long-standing history of myelofibrosis. She also has slowly progressive Alzheimer's disease and is currently taking Aricept. I was informed by her daughter that her appetite has improved and she is continuing to gain weight now. The patient is not complaining of any pain or discomfort. She reports that her bowel habits are regular. She has not noticed any blood in her urine or stool. She further denies having any shortness of breath, fever, or chills. The family is trying very hard to engage the patient with some form of regular exercise but they are continuing to meet some resistance.     Past Medical History:   Diagnosis Date    Alzheimer's disease     Anemia     Anemia NEC     Cataract     Glaucoma     Heart murmur     HTN (hypertension)     Hyperlipidemia     Myelofibrosis (HCC)     OA (osteoarthritis)     lumbar spine    Osteopenia      Past Surgical History:   Procedure Laterality Date    APPENDECTOMY      HX HYSTERECTOMY      vaginal     Social History     Social History    Marital status:      Spouse name: N/A    Number of children: N/A    Years of education: N/A     Occupational History    retired      Social History Main Topics    Smoking status: Never Smoker    Smokeless tobacco: Never Used    Alcohol use 0.5 oz/week     1 Glasses of wine per week      Comment: occassional    Drug use: No    Sexual activity: Not on file     Other Topics Concern    Not on file     Social History Narrative     Family History   Problem Relation Age of Onset    Cancer Mother      colon    Anemia Maternal Aunt     Osteoporosis Maternal Grandmother      Current Outpatient Prescriptions   Medication Sig Dispense Refill    dronabinol (MARINOL) 5 mg capsule Take 1 Cap by mouth two (2) times a day. Max Daily Amount: 10 mg. 60 Cap 3    iron polysacch complex-b12-fa (FERREX 150 FORTE) capsule Take 1 Cap by mouth daily. 30 Cap 2    citalopram (CELEXA) 20 mg tablet Take  by mouth daily.  ALPRAZolam (XANAX) 0.25 mg tablet Take 0.5-1 Tabs by mouth daily as needed. 30 Tab 0    citalopram (CELEXA) 10 mg tablet Take 1 Tab by mouth daily. 30 Tab 1    levothyroxine (SYNTHROID) 25 mcg tablet Take 1 Tab by mouth Daily (before breakfast). For Hypothyroidism (thyroid) 30 Tab 6    ezetimibe (ZETIA) 10 mg tablet Take 1 Tab by mouth daily. For cholesterol 90 Tab 3    donepezil (ARICEPT) 5 mg tablet Take 1 Tab by mouth nightly. For memory 90 Tab 3    amLODIPine (NORVASC) 5 mg tablet Take 1 Tab by mouth daily. 90 Tab 3    calcium-cholecalciferol, D3, (CALCIUM 600 + D) tablet Take 1 Tab by mouth daily. 90 Tab 3    multivitamin (ONE A DAY) tablet Take 1 Tab by mouth daily.  EPOETIN KAYLYN (PROCRIT IJ) by Injection route as needed. Review of Systems  Constitutional: The patient has no acute distress or discomfort. HEENT: The patient denies recent head trauma, eye pain, blurred vision,  hearing deficit, oropharyngeal mucosal pain or lesions, and the patient denies throat pain or discomfort. Lymphatics: The patient denies palpable peripheral lymphadenopathy. Hematologic: The patient denies having bruising, bleeding, or progressive fatigue. Respiratory: Patient denies having shortness of breath, cough, sputum production, fever, or dyspnea on exertion. Cardiovascular: The patient denies having leg pain, leg swelling, heart palpitations, chest permit, chest pain, or lightheadedness. The patient denies having dyspnea on exertion. Gastrointestinal: The patient denies having nausea, emesis, or diarrhea.  The patient denies having any hematemesis or blood in the stool. Genitourinary: Patient denies having urinary urgency, frequency, or dysuria. The patient denies having blood in the urine. Psychological: The patient denies having symptoms of nervousness, anxiety, depression, or thoughts of harming self. Skin: Patient denies having skin rashes, skin, ulcerations, or unexplained itching or pruritus. Musculoskeletal: The patient denies having pain in the joints or bones. Neurologic: The patient has slowly progressive Alzheimer's disease. Objective:     Visit Vitals    /75    Pulse 68    Temp 98.1 °F (36.7 °C) (Oral)    Wt 78.5 kg (173 lb)    BMI 30.65 kg/m2     ECOG PS=1-2     Physical Exam:   Gen. Appearance: The patient is in no acute distress. Skin: There is no bruise or rash. HEENT: The exam is unremarkable. Neck: Supple without lymphadenopathy or thyromegaly. Lungs: Clear to auscultation and percussion; there are no wheezes or rhonchi. Heart: Regular rate and rhythm; there are no murmurs, gallops, or rubs. Anterior chest wall. Left breast: Deferred. Abdomen: Bowel sounds are present and normal.  There is no guarding, tenderness, or hepatosplenomegaly. Extremities: There is no clubbing, cyanosis, or edema. Neurologic: There are no focal neurologic deficits. Lymphatics: There is no palpable peripheral lymphadenopathy. Musculoskeletal: The patient has full range of motion at all joints. There is no evidence of joint deformity or effusions. There is no focal joint tenderness. Neurologic: The patient has slowly progressive Alzheimer's disease  Psychological/psychiatric: There is no clinical evidence of anxiety, depression, or melancholy. Lab data: The CBC from today shows that her WBC count 2.8, hemoglobin is 11.7 g/dL, hematocrit 36%, and the platelet count is [phone].       Results for orders placed or performed during the hospital encounter of 09/12/14   CBC WITH 3 PART DIFF     Status: Abnormal   Result Value Ref Range Status    WBC 3.1 (L) 4.5 - 13.0 K/uL Final    RBC 4.22 4.10 - 5.10 M/uL Final    HGB 11.2 (L) 12.0 - 16.0 g/dL Final    HCT 33.7 (L) 36 - 48 % Final    MCV 79.9 78 - 102 FL Final    MCH 26.5 25.0 - 35.0 PG Final    MCHC 33.2 31 - 37 g/dL Final    RDW 15.4 (H) 11.5 - 14.5 % Final    PLATELET 877 097 - 687 K/uL Final    NEUTROPHILS 55 40 - 70 % Final    MIXED CELLS 16 0.1 - 17 % Final    LYMPHOCYTES 29 14 - 44 % Final    ABS. NEUTROPHILS 1.7 (L) 1.8 - 9.5 K/UL Final    ABS. MIXED CELLS 0.5 0.0 - 2.3 K/uL Final    ABS. LYMPHOCYTES 0.9 (L) 1.1 - 5.9 K/UL Final     Comment: Test Performed by [facility]. Results reviewed by Medical Director. DF AUTOMATED   Final           Assessment:     1. Chronic myeloproliferative disorder ([address]    2. Anemia, unspecified type    3. 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 Myelofibrosis ([address]    5. Chronic fatigue      Plan:   Chronic myeloproliferative disorder/Anemia and myelofibrosis: The patient has slowly progressive myelofibrosis and associated anemia. I have informed the patient that the CBC from today,  revealed a mild leukopenia with a WBC count 2.8, hemoglobin is 11.7 g/dL, hematocrit is 36%, and the platelet count was [phone]. A previous flow cytometry done on 4/1/2016 did not show any evidence of immunophenotypic abnormality. We will continue to monitor the patient every 12 weeks and if her hemoglobin should decline below 30% she will be treated with  a dose of Procrit 60,000 units subcutaneous every 2 weeks. Alzheimer's disease: The patient's family is providing a significant level of support and oversight. She will continue with her daily Aricept  She is also going to a [employer], on a daily basis as part of her treatment strategy. Chronic fatigue: patient denies any fatigue at present.   The patient and her family will continue the mild exercise program such as walking for short distances multiple times during the day to stimulate her metabolism. Anorexia (resolved): The patient weight is stable now. I have also recommended that the patient continue using the nutritional supplements such as Ensure or boost  once or  twice daily. At this time a comprehensive metabolic panel, iron profile, ferritin level. I will have her return to the clinic for a complete reassessment again in 12 weeks.   Orders Placed This Encounter    COMPLETE CBC & AUTO DIFF WBC    InHouse CBC ([facility])     Standing Status:   Future     Number of Occurrences:   1     Standing Expiration Date:   3/97/9010    METABOLIC PANEL, COMPREHENSIVE     Standing Status:   Future     Standing Expiration Date:   6/20/2019    IRON PROFILE     Standing Status:   Future     Standing Expiration Date:   6/20/2019    FERRITIN     Standing Status:   Future     Standing Expiration Date:   6/20/2019       Devin Arredondo MD  6/19/2018

## 2018-06-19 NOTE — MR AVS SNAPSHOT
Gio Medina 
 
 
 [facility] [address] Located within [facility] [address] 
[phone] Patient: Homero Blizzard MRN: [id number] :1935 Visit Information Date & Time Provider Department Dept. Phone Encounter #  
 2018  3:15 PM MD [facility]  [id number] Follow-up Instructions Return in about 3 months (around 2018). Your Appointments 2018  1:15 PM  
Office Visit with MD [facility]  [address] [facility]) Appt Note: [address] [place] [address] [address] Located within [facility] [address]  
[phone]  
  
   
 [facility] [address] Upcoming Health Maintenance Date Due DTaP/Tdap/Td series (1 - Tdap) 1956 ZOSTER VACCINE AGE 60> 10/24/1996 GLAUCOMA SCREENING Q2Y 2001 Pneumococcal 65+ Low/Medium Risk (1 of 2 - PCV13) 2001 MEDICARE YEARLY EXAM 3/14/2018 Influenza Age 5 to Adult 2018 Allergies as of 2018  Review Complete On: 2018 By: Jarocho Dean MD  
  
 Severity Noted Reaction Type Reactions Aspirin  2010    Other (comments) Upset stomach Crestor [Rosuvastatin]  2010    Unknown (comments) Namenda [Memantine]  2010    Unknown (comments) Zocor [Simvastatin]  2010    Other (comments) Cramps, weakness Current Immunizations  Never Reviewed Name Date H1N1 FLU VACCINE 2009 Influenza Vaccine Whole 2009 Not reviewed this visit You Were Diagnosed With   
  
 Codes Comments [facility]rol[facility] ([facility]    -  Primary ICD-10-CM: D47.1 ICD-9-CM: 238.79 Anemia, unspecified type     ICD-10-CM: D64.9 ICD-9-CM: 837. 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 
ICD-9-CM: 780.79 Vitals BP Pulse Temp Weight(growth percentile) BMI OB Status 136/75 68 98.1 °F (36.7 °C) (Oral) 173 lb (78.5 kg) 30.65 kg/m2 Hysterectomy Smoking Status Never Smoker BMI and BSA Data Body Mass Index Body Surface Area  
 30.65 kg/m 2 1.87 m 2 Preferred Pharmacy Pharmacy Name Phone [facility], [address] [phone] Your Updated Medication List  
  
   
This list is accurate as of 6/19/18  4:08 PM.  Always use your most recent med list.  
  
  
  
  
 ALPRAZolam 0.25 mg tablet Commonly known as:  Donata Fess Take 0.5-1 Tabs by mouth daily as needed. amLODIPine 5 mg tablet Commonly known as:  Isaiah Rings Take 1 Tab by mouth daily. calcium-cholecalciferol (D3) tablet Commonly known as:  Calcium 600 + D Take 1 Tab by mouth daily. * CeleXA 20 mg tablet Generic drug:  citalopram  
Take  by mouth daily. * citalopram 10 mg tablet Commonly known as:  Marlise Cloud Take 1 Tab by mouth daily. donepezil 5 mg tablet Commonly known as:  ARICEPT Take 1 Tab by mouth nightly. For memory  
  
 dronabinol 5 mg capsule Commonly known as:  [place] Take 1 Cap by mouth two (2) times a day. Max Daily Amount: 10 mg.  
  
 ezetimibe 10 mg tablet Commonly known as:  Christobal Sheer Take 1 Tab by mouth daily. For cholesterol  
  
 iron polysacch complex-b12-fa capsule Commonly known as:  [address] Take 1 Cap by mouth daily. levothyroxine 25 mcg tablet Commonly known as:  synthroid Take 1 Tab by mouth Daily (before breakfast). For Hypothyroidism (thyroid)  
  
 multivitamin tablet Commonly known as:  ONE A DAY Take 1 Tab by mouth daily. PROCRIT INJECTION  
by Injection route as needed. * Notice: This list has 2 medication(s) that are the same as other medications prescribed for you. Read the directions carefully, and ask your doctor or other care provider to review them with you. We Performed the Following COMPLETE CBC & AUTO DIFF WBC [69556 CPT(R)] Follow-up Instructions Return in about 3 months (around 9/19/2018). To-Do List   
 06/19/2018 Lab:  CBC WITH 3 PART DIFF   
  
 06/21/2018 2:00 PM  
  Appointment with [facility] RM 2 at [facility] PATO [place] ([phone]) ORAL CONTRAST/PREP   Oral Contrast/Prep from radiology  no later than one day prior to study date/time. DIET RESTRICTIONS  Nothing to eat or drink, 4 hours prior to study  May have water to take meds  May drink oral contrast if directed  GENERAL INSTRUCTIONS  If you were given premedications for IV contrast to take prior to having your study, please arrange to have someone drive you to your appointment. If you have had a creatinine level drawn within the past 30 days, please bring most recent results to your appt. MEDICATIONS  Bring a complete list of all medications you are currently taking to include prescriptions, over-the-counter meds, herbals, vitamins & any dietary supplements. RELATED STUDY INFORMATION  Bring any films, CDs, and reports related with you on the day of your exam.  This only includes studies done outside of [address] [place] [address], [facility], [facility], and [place]. QUESTIONS  Notify the CT Department if you have any questions concerning your study. [place] [address] - [phone] [facility] - [phone] Patient Instructions Anemia From Chronic Disease: Care Instructions Your Care Instructions Anemia is a low level of red blood cells. Red blood cells carry oxygen from your lungs to the rest of your body.  Sometimes when you have a long-term (chronic) disease, such as kidney disease, arthritis, diabetes, cancer, or an infection, your body does not make enough red blood cells. Follow-up care is a key part of your treatment and safety. Be sure to make and go to all appointments, and call your doctor if you are having problems. It's also a good idea to know your test results and keep a list of the medicines you take. How can you care for yourself at home? · Follow your doctor's instructions to treat the chronic condition that is causing the anemia. · Be safe with medicines. Take your medicine to treat your chronic condition exactly as prescribed. Call your doctor if you think you are having a problem with your medicine. · Take your medicine for anemia exactly as prescribed. Call your doctor if you think you are having a problem with your medicine. Medicines to increase the number of red blood cells (such as epoetin or darbepoetin) may be given as an injection. ¨ If you miss a dose, take it as soon as you can, unless it is almost time for your next dose. In that case, get back on your regular schedule and take only one dose. ¨ Do not freeze this medicine. Store it in the refrigerator. Do not shake the bottle before you prepare the shot. · Keep all your appointments for blood tests to check on your hemoglobin levels. When should you call for help? Call 911 anytime you think you may need emergency care. For example, call if: 
? · You passed out (lost consciousness). ?Call your doctor now or seek immediate medical care if: 
? · You are short of breath. ? · You are dizzy or light-headed, or you feel like you may faint. ? · You have new or worse bleeding. ? Watch closely for changes in your health, and be sure to contact your doctor if: 
? · You feel weaker or more tired than usual.  
? · You do not get better as expected. Where can you learn more? Go to http://juwan-jose.info/. Enter E502 in the search box to learn more about \"Anemia From Chronic Disease: Care Instructions. \" 
 Current as of: October 13, 2016 Content Version: 11.4 © 9660-2798 Healthwise, [employer]. Care instructions adapted under license by [employer] (which disclaims liability or warranty for this information). If you have questions about a medical condition or this instruction, always ask your healthcare professional. [address] any warranty or liability for your use of this information. Introducing [facility]! [employer] introduces 9SLIDES patient portal. Now you can access parts of your medical record, email your doctor's office, and request medication refills online. 1. In your internet browser, go to https://[employer]. [employer]ula/[employer] 2. Click on the First Time User? Click Here link in the Sign In box. You will see the New Member Sign Up page. 3. Enter your 9SLIDES Access Code exactly as it appears below. You will not need to use this code after youve completed the sign-up process. If you do not sign up before the expiration date, you must request a new code. · 9SLIDES Access Code: [email or web address] Expires: 9/5/2018 10:03 AM 
 
4. Enter the last four digits of your Social Security Number (xxxx) and Date of Birth (mm/dd/yyyy) as indicated and click Submit. You will be taken to the next sign-up page. 5. Create a 9SLIDES ID. This will be your 9SLIDES login ID and cannot be changed, so think of one that is secure and easy to remember. 6. Create a 9SLIDES password. You can change your password at any time. 7. Enter your Password Reset Question and Answer. This can be used at a later time if you forget your password. 8. Enter your e-mail address. You will receive e-mail notification when new information is available in [address] 9. Click Sign Up. You can now view and download portions of your medical record. 10. Click the Download Summary menu link to download a portable copy of your medical information. If you have questions, please visit the Frequently Asked Questions section of the [employer] Point[employer] website. Remember, LiveClips is NOT to be used for urgent needs. For medical emergencies, dial 911. Now available from your iPhone and Android! Please provide this summary of care documentation to your next provider. Your primary care clinician is listed as LAURYN JACKSON. If you have any questions after today's visit, please call [phone].

## 2018-06-19 NOTE — PATIENT INSTRUCTIONS
Anemia From Chronic Disease: Care Instructions  Your Care Instructions    Anemia is a low level of red blood cells. Red blood cells carry oxygen from your lungs to the rest of your body. Sometimes when you have a long-term (chronic) disease, such as kidney disease, arthritis, diabetes, cancer, or an infection, your body does not make enough red blood cells. Follow-up care is a key part of your treatment and safety. Be sure to make and go to all appointments, and call your doctor if you are having problems. It's also a good idea to know your test results and keep a list of the medicines you take. How can you care for yourself at home? · Follow your doctor's instructions to treat the chronic condition that is causing the anemia. · Be safe with medicines. Take your medicine to treat your chronic condition exactly as prescribed. Call your doctor if you think you are having a problem with your medicine. · Take your medicine for anemia exactly as prescribed. Call your doctor if you think you are having a problem with your medicine. Medicines to increase the number of red blood cells (such as epoetin or darbepoetin) may be given as an injection. ¨ If you miss a dose, take it as soon as you can, unless it is almost time for your next dose. In that case, get back on your regular schedule and take only one dose. ¨ Do not freeze this medicine. Store it in the refrigerator. Do not shake the bottle before you prepare the shot. · Keep all your appointments for blood tests to check on your hemoglobin levels. When should you call for help? Call 911 anytime you think you may need emergency care. For example, call if:  ? · You passed out (lost consciousness). ?Call your doctor now or seek immediate medical care if:  ? · You are short of breath. ? · You are dizzy or light-headed, or you feel like you may faint. ? · You have new or worse bleeding. ? Watch closely for changes in your health, and be sure to contact your doctor if:  ? · You feel weaker or more tired than usual.   ? · You do not get better as expected. Where can you learn more? Go to http://juwan-jose.info/. Enter E502 in the search box to learn more about \"Anemia From Chronic Disease: Care Instructions. \"  Current as of: October 13, 2016  Content Version: 11.4  © 3676-9748 [employer]ue. Care instructions adapted under license by [employer] (which disclaims liability or warranty for this information). If you have questions about a medical condition or this instruction, always ask your healthcare professional. [place] [address] any warranty or liability for your use of this information.

## 2018-06-21 ENCOUNTER — HOSPITAL ENCOUNTER (OUTPATIENT)
Dept: CT IMAGING | Age: 83
Discharge: HOME | End: 2018-06-21
Attending: INTERNAL MEDICINE
Payer: MEDICARE

## 2018-06-21 DIAGNOSIS — R16.0 LIVER MASS: ICD-10-CM

## 2018-06-21 DIAGNOSIS — R93.2 ABNORMAL LIVER SCAN: ICD-10-CM

## 2018-06-21 PROCEDURE — 74170 CT ABD WO CNTRST FLWD CNTRST: CPT

## 2018-06-21 PROCEDURE — 74011636320 HC RX REV CODE- 636/320: Performed by: INTERNAL MEDICINE

## 2018-06-21 RX ADMIN — IOPAMIDOL 80 ML: 612 INJECTION, SOLUTION INTRAVENOUS at 14:12

## 2019-10-11 ENCOUNTER — OFFICE VISIT (OUTPATIENT)
Dept: PULMONOLOGY | Age: 84
End: 2019-10-11

## 2019-10-11 VITALS
DIASTOLIC BLOOD PRESSURE: 70 MMHG | HEIGHT: 63 IN | RESPIRATION RATE: 20 BRPM | HEART RATE: 98 BPM | WEIGHT: 173 LBS | OXYGEN SATURATION: 98 % | TEMPERATURE: 98.1 F | BODY MASS INDEX: 30.65 KG/M2 | SYSTOLIC BLOOD PRESSURE: 160 MMHG

## 2019-10-11 DIAGNOSIS — Z77.090 HISTORY OF ASBESTOS EXPOSURE: ICD-10-CM

## 2019-10-11 DIAGNOSIS — R05.3 CHRONIC COUGH: Primary | ICD-10-CM

## 2019-10-11 DIAGNOSIS — R06.09 DYSPNEA ON EXERTION: ICD-10-CM

## 2019-10-11 RX ORDER — FLUTICASONE PROPIONATE 50 MCG
2 SPRAY, SUSPENSION (ML) NASAL DAILY
Qty: 3 BOTTLE | Refills: 3 | Status: SHIPPED | OUTPATIENT
Start: 2019-10-11

## 2019-10-11 RX ORDER — MEMANTINE HYDROCHLORIDE 10 MG/1
TABLET ORAL
Refills: 3 | COMMUNITY
Start: 2019-09-07

## 2019-10-11 RX ORDER — AZELASTINE 1 MG/ML
1 SPRAY, METERED NASAL 2 TIMES DAILY
Qty: 3 BOTTLE | Refills: 3 | Status: SHIPPED | OUTPATIENT
Start: 2019-10-11 | End: 2020-05-18 | Stop reason: SDUPTHER

## 2019-10-11 RX ORDER — FLUTICASONE PROPIONATE 50 MCG
2 SPRAY, SUSPENSION (ML) NASAL DAILY
Qty: 1 BOTTLE | Refills: 11 | Status: SHIPPED | OUTPATIENT
Start: 2019-10-11 | End: 2019-10-11 | Stop reason: SDUPTHER

## 2019-10-11 RX ORDER — AZELASTINE 1 MG/ML
1 SPRAY, METERED NASAL 2 TIMES DAILY
Qty: 1 BOTTLE | Refills: 11 | Status: SHIPPED | OUTPATIENT
Start: 2019-10-11 | End: 2019-10-11 | Stop reason: SDUPTHER

## 2019-10-11 RX ORDER — TRAZODONE HYDROCHLORIDE 50 MG/1
50 TABLET ORAL
COMMUNITY

## 2019-10-11 RX ORDER — NEBIVOLOL HYDROCHLORIDE 5 MG/1
TABLET ORAL
Refills: 0 | COMMUNITY
Start: 2019-10-01

## 2019-10-11 NOTE — PROGRESS NOTES
Toño Pineda presents today for   Chief Complaint   Patient presents with    Cough       Is someone accompanying this pt? Yes Daughter    Is the patient using any DME equipment during [address]? No    -DME Company None    Depression Screening:  3 most recent PHQ Screens 6/19/2018   Little interest or pleasure in doing things Not at all   Feeling down, depressed, irritable, or hopeless Not at all   Total Score PHQ 2 0       Learning Assessment:  Learning Assessment 2/27/2013   PRIMARY LEARNER Patient   PRIMARY LANGUAGE ENGLISH   LEARNER PREFERENCE PRIMARY DEMONSTRATION   ANSWERED BY patient   RELATIONSHIP SELF       Abuse Screening:  No flowsheet data found. Fall Risk  Fall Risk Assessment, last 12 mths 10/11/2019   Able to walk? Yes   Fall in past 12 months? No         Coordination of Care:  1. Have you been to the ER, urgent care clinic since your last visit? Hospitalized since your last visit? No    2. Have you seen or consulted any other health care providers outside of the [address] [place] [address] since your last visit? Include any pap smears or colon screening.  No

## 2019-10-11 NOTE — PROGRESS NOTES
[address] [place] [address]  [phone]    [facility]  Pulmonary, Critical Care, and Sleep Medicine    Pulmonary Office Initial Consultation  Name: Monserrat Jones 81 y.o. female  MRN: [id number]  : 1935  Service Date: 10/11/19    Referring Provider: Stefani Griggs, [address] [place] [address], [address] [place] [phone],[phone]  Chief Complaint:   Chief Complaint   Patient presents with    Cough       History of Present Illness:  (hx obtained by daughter, given patient's underlying dementia)  Monserrat Jones is a 81 y.o. female, who presents to Pulmonary clinic referred for chronic cough by her PCP. Patient reports that the patient has issues with a chronic cough present since at least March time. She reports that she does not live with the patient, she sees the patient every few weeks. She notes that during this time, the patient has had a worsening cough since May, occurs throughout the day, coughing paroxysms until the patient tears up. No variation throughout the day, no nocturnal awakenings. No modifying factors. No aggravating factors, no precipitating factors. They report no other associated symptoms. Patient reports that she thinks her cough has improved. However daughter reports that this is not the case. They report that the patient last saw her PCP and was given Flonase, however patient only tried it for a few weeks and discontinued it for some reason. Daughter reports patient also has some issues with shortness of breath with strenuous exertion, patient able to perform ADLs, but develops labored breathing with lifting of items, as well as ambulating long distances such as around stores. 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   Patient denies fevers, chills, night sweats, GERD, heartburn, nausea, vomiting, diarrhea, chest pain, voice hoarseness, aspiration, dysphagia. Past Medical Hx: I have personally reviewed medical hx  Past Medical History:   Diagnosis Date    Alzheimer's disease ([address]     Anemia     Anemia NEC     Cataract     Glaucoma     Heart murmur     HTN (hypertension)     Hyperlipidemia     Myelofibrosis (HCC)     OA (osteoarthritis)     lumbar spine    Osteopenia        Past Surgical Hx: I have personally reviewed surgical hx  Past Surgical History:   Procedure Laterality Date    APPENDECTOMY      HX HYSTERECTOMY      vaginal       Family Hx: I have personally reviewed the family hx. No family hx of hereditary lung disease with immediate family  Family History   Problem Relation Age of Onset    Cancer Mother         colon    Anemia Maternal Aunt     Osteoporosis Maternal Grandmother        Social Hx: I have personally reviewed the social hx. Social History     Socioeconomic History    Marital status:      Spouse name: Not on file    Number of children: Not on file    Years of education: Not on file    Highest education level: Not on file   Occupational History    Occupation: retired   Social Needs    Financial resource strain: Not on file    Food insecurity:     Worry: Not on file     Inability: Not on file   [employer] needs:     Medical: Not on file     Non-medical: Not on file   Tobacco Use    Smoking status: Never Smoker    Smokeless tobacco: Never Used   Substance and Sexual Activity    Alcohol use:  Yes     Alcohol/week: 0.8 standard drinks     Types: 1 Glasses of wine per week     Comment: occassional    Drug use: No    Sexual activity: Not on file   Lifestyle    Physical activity:     Days per week: Not on file     Minutes per session: Not on file    Stress: Not on file   Relationships    Social connections:     Talks on phone: Not on file     Gets together: Not on file     Attends Buddhism service: Not on file Active member of club or organization: Not on file     Attends meetings of clubs or organizations: Not on file     Relationship status: Not on file    Intimate partner violence:     Fear of current or ex partner: Not on file     Emotionally abused: Not on file     Physically abused: Not on file     Forced sexual activity: Not on file   Other Topics Concern    Not on file   Social History Narrative    Not on file     Allergies: I have reviewed the allergy hx  Allergies   Allergen Reactions    Aspirin Other (comments)     Upset stomach    Crestor [Rosuvastatin] Unknown (comments)    Namenda [Memantine] Unknown (comments)    Zocor [Simvastatin] Other (comments)     Cramps, weakness       Medications:  I have reviewed the patient's medications  Prior to Admission medications    Medication Sig Start Date End Date Taking? Authorizing Provider   traZODone (DESYREL) 50 mg tablet Take  by mouth nightly. Yes Provider, Historical   levothyroxine (SYNTHROID) 25 mcg tablet Take 1 Tab by mouth Daily (before breakfast). For Hypothyroidism (thyroid) 3/27/13  Yes Elgie Lie., NP   ezetimibe (ZETIA) 10 mg tablet Take 1 Tab by mouth daily. For cholesterol 3/27/13  Yes Elgie Lie., NP   donepezil (ARICEPT) 5 mg tablet Take 1 Tab by mouth nightly. For memory 3/27/13  Yes Elgie Lie., NP   amLODIPine (NORVASC) 5 mg tablet Take 1 Tab by mouth daily. 3/27/13  Yes Elgie Lie., NP   memantine (NAMENDA) 10 mg tablet TAKE 1 TABLET BY MOUTH TWICE A DAY 9/7/19   Provider, Historical   BYSTOLIC 5 mg tablet TAKE 1 TABLET BY MOUTH EVERY DAY FOR 30 DAYS 10/1/19   Provider, Historical   dronabinol (MARINOL) 5 mg capsule Take 1 Cap by mouth two (2) times a day. Max Daily Amount: 10 mg. 2/10/17   Dodie Beasley MD   iron polysacch complex-b12-fa (FERREX 150 FORTE) capsule Take 1 Cap by mouth daily. 4/22/14   Kaylyn Gamboa NP   citalopram (CELEXA) 20 mg tablet Take  by mouth daily.     Provider, Historical   ALPRAZolam [place]) 0.25 mg tablet Take 0.5-1 Tabs by mouth daily as needed. 9/6/13   Austin Rendon MD   citalopram (CELEXA) 10 mg tablet Take 1 Tab by mouth daily. 9/6/13   Austin Rendon MD   calcium-cholecalciferol, D3, (CALCIUM 600 + D) tablet Take 1 Tab by mouth daily. 3/27/13   Silvana Garcia, LIZ   multivitamin (ONE A DAY) tablet Take 1 Tab by mouth daily. Provider, Historical   EPOETIN KAYLYN (PROCRIT IJ) by Injection route as needed. 6/9/10   Provider, Historical       Immunizations:  I have reviewed the patient's immunizations  Immunization History   Administered Date(s) Administered    H1N1 Influenza Virus Vaccine 11/02/2009    Influenza Vaccine Whole 09/01/2009       Review of Systems:  A complete review of systems was performed as stated in the HPI, all others are negative.       Objective:    Physical Exam:  /70 (BP 1 Location: Left arm, BP Patient Position: At rest)   Pulse 98   Temp 98.1 °F (36.7 °C) (Oral)   Resp 20   Ht 5' 3\" (1.6 m)   Wt 78.5 kg (173 lb)   SpO2 98%   BMI 30.65 kg/m²    Vitals were personally reviewed  Gen: no acute distress, pleasant and cooperative, sitting up in chair, able to climb to exam table w/o difficulty  HEENT: normocephalic/atraumatic, PERRLA, EOMI, no scleral icterus, nasal turbinates have no erythema, no nasal polyps, no oral lesions, poor dentition, Mallampati III  Neck: supple, trachea midline, no JVD, no cervical and supraclavicular adenopathy  Chest: no lesions, with even rise and fall, no pectus excavatum or flail chest  CVS: regular rate rhythm, S1/S2, no murmurs/rubs/gallops  Lungs: good air entry B/L, CTABL, no wheezes/rales/rhonchi  Back: no kyphosis or scoliosis  Abdomen: soft, nontender, bowel sounds present, no hepatosplenomegaly  Ext: no pitting edema B/L, no peripheral cyanosis or clubbing  Neuro: grossly normal, AAOx3, normal strength and coordination grossly, no focal deficits  Skin: no rashes, erythema, lesions  Psych: normal memory, thought content, and processing    Labs: I have reviewed the patient's available labs  Lab Results   Component Value Date/Time    WBC 2.8 (L) 06/19/2018 03:21 PM    HGB (POC) 11.0 (A) 04/22/2014 12:34 PM    HGB 11.7 (L) 06/19/2018 03:21 PM    HCT (POC) 35.7 (A) 04/22/2014 12:34 PM    HCT 34.6 (L) 06/19/2018 03:21 PM    PLATELET 881 16/30/7018 03:21 PM    MCV 76.5 06/19/2018 03:21 PM     Lab Results   Component Value Date/Time    Sodium 137 06/19/2018 03:21 PM    Potassium 4.1 06/19/2018 03:21 PM    Chloride 100 06/19/2018 03:21 PM    CO2 29 06/19/2018 03:21 PM    Anion gap 8 06/19/2018 03:21 PM    Glucose 83 06/19/2018 03:21 PM    BUN 16 06/19/2018 03:21 PM    Creatinine 0.79 06/19/2018 03:21 PM    BUN/Creatinine ratio 20 06/19/2018 03:21 PM    GFR est AA >60 06/19/2018 03:21 PM    GFR est non-AA >60 06/19/2018 03:21 PM    Calcium 8.5 06/19/2018 03:21 PM    Bilirubin, total 0.3 06/19/2018 03:21 PM    AST (SGOT) 16 06/19/2018 03:21 PM    Alk. phosphatase 76 06/19/2018 03:21 PM    Protein, total 7.3 06/19/2018 03:21 PM    Albumin 3.4 06/19/2018 03:21 PM    Globulin 3.9 06/19/2018 03:21 PM    A-G Ratio 0.9 06/19/2018 03:21 PM    ALT (SGPT) 19 06/19/2018 03:21 PM     Outside records reviewed in clinic as follows:  -Patient last saw her PCP, Dr. Sims Boast on 7/18/2019, noted a chronic cough, denies fever or chest pain, given a course of doxycycline 100 mg twice daily for 10 days, along with a chest x-ray  -Progress note from Dr. Sims Boast on 6/3/2019 notes that patient had a chronic cough, discontinue losartan at this time, and started on fluticasone intranasally  -Chest x-ray from 7/18/2019 noted that patient had a better inspiratory effort when compared to chest x-ray from prior year, no effusions no adenopathy and no faint nodule. Heart size is now normal without evidence of CHF, no lung nodules seen, no active lung disease. Read by radiologist Judith Harris.   Erinn Duncan MD    Imaging:  I have personally reviewed patient's imaging as follows--CT chest with IV contrast from 6/12/2018 shows clear lung fields bilaterally without evidence of emphysema, nodules, masses, lymphadenopathy, effusions. There was a lesion along the dome of the diaphragm along the right. Official report per radiology:  Status: Final result (Exam End: 6/12/2018 14:16) Provider Status: Open   Study Result     CT CHEST W CONT     Clinical information: SOB     Comparison: None.     Technique: Axial CT of the chest with intravenous contrast. Coronal and sagittal  reconstructions. Soft tissue, lung, and bone window reconstructions. All CT  scans at this facility are performed using dose optimization technique as  appropriate to a performed exam, to include automated exposure control,  adjustment of the mA and/or kV according to patient size (including appropriate  matching for site-specific examinations), or use of iterative reconstruction  technique.      Findings:      - Thyroid: Unremarkable.     - Airways: Trachea and main stem bronchi patent.      - Lungs: Clear.     - Pleura: No pneumothorax or pleural effusion.     - Pulmonary Arteries: Unremarkable.     - Aorta: Mild atherosclerosis.     - Heart: Heart size stable.      - Pericardial effusion: No pericardial effusion.     - Lymph nodes: No enlarged hilar lymph nodes.       - Chest wall and lower neck: Unremarkable.     - Osseous structures: Unremarkable.     - Upper abdomen: Peripherally nodular enhancing mass at the dome of the right  hepatic lobe. Small sliding-type hiatal hernia.        IMPRESSION  Impression:      1. No findings to explain shortness of breath. No lymphadenopathy or lung  nodules.     2.  Lesion in the right hepatic dome, most consistent with a hemangioma.     3.  Small sliding-type hiatal hernia. PFTs:  I have reviewed the patient's PFTs --spirometry from today is normal.  No bronchodilator effect seen.     TTE:  I have reviewed the patient's TTE results  No results found for this or any previous visit. Assessment and Plan:  81 y.o. female with:    Impression:  1. Chronic cough: Given limited history, etiology unclear. Patient denies any symptoms of GERD, making LPR less likely, however is possible. Would still consider a postnasal drip versus upper airway cough syndrome. Patient does not have symptoms of reactive airway such as NAEB. 2.  Dyspnea on exertion: Symptoms are likely due to deconditioning given normal spirometry  3. History of asbestos exposure: Secondary exposure from her     Plan:  -I had a long discussion with the patient's daughter regarding potential work-up for chronic cough. I offered repeat imaging with CT chest to rule out endobronchial lesion versus GI referral for pH impedance testing and ENT referral for direct laryngoscopy for evaluation of LPR. At this point, given the patient's advanced age, she would like to treat the patient empirically for postnasal drip and is agreeable to bronchial provocation testing to rule out reactive airway disease.  -Methacholine challenge test ordered  -Restart Flonase 2 sprays to each nostril once daily  -Start Astelin nasal spray 1 spray to each nostril twice daily  -We will hold off on empiric antihistamine therapy for upper airway cough syndrome given the patient's dementia, I am afraid antihistamine therapy may worsen dementia or lead to delirium. Follow-up and Dispositions    · Return in about 3 months (around 1/11/2020). Orders Placed This Encounter    AMB POC SPIROMETRY W/BRONCHODILATOR    RT--METHACHOLINE BRONCH CHALLENGE    azelastine (ASTELIN) 137 mcg (0.1 %) nasal spray    fluticasone propionate (FLONASE) 50 mcg/actuation nasal spray     48 minutes were spent in this patient encounter with greater than 50% of that time spent in face-to-face counseling regarding the above. I spent much of this time going over potential work-up of various etiologies for chronic cough.   We weighed risks and benefits given patient's advanced age and underlying dementia. We also a risks and benefits of empiric treatment for upper airway cough syndrome and LPR. Patient's daughter decided with to go with least invasive treatments and testing initially and will follow her symptoms with the diarrhea. All of their questions were answered.       Otila Olszewski MD/MPH     Pulmonary, Critical Care Medicine  [facility]

## 2019-10-11 NOTE — LETTER
10/13/19 Patient: Eliud Ventura YOB: 1935 Date of Visit: 10/11/2019 Janet Torre MD 
[facility] [address] [address] VIA Facsimile: [phone] Dear Janet Torre MD, Thank you for referring Ms. Alicia Daugherty to [address] [place] [address] for evaluation. My notes for this consultation are attached. If you have questions, please do not hesitate to call me. I look forward to following your patient along with you. Sincerely, Bruce Roger MD

## 2019-10-14 ENCOUNTER — TELEPHONE (OUTPATIENT)
Dept: PULMONOLOGY | Age: 84
End: 2019-10-14

## 2019-10-14 NOTE — TELEPHONE ENCOUNTER
Spoke with daughter Mariana Chavez re appt 10/11 and nasal sprays pt is to start using. Questions answered.  Daughter verbalizes understanding

## 2019-10-14 NOTE — TELEPHONE ENCOUNTER
Pt daughter has questions regarding pt medications and previous appt from 10/11. Please advise [phone].

## 2019-11-19 ENCOUNTER — TELEPHONE (OUTPATIENT)
Dept: PULMONOLOGY | Age: 84
End: 2019-11-19

## 2019-11-19 NOTE — TELEPHONE ENCOUNTER
Daughter Stacia Saez called with questions re testing. Pt to get Methacholine challenge 11/25/19. She will call for results if she doesn't hear from Dr. Sonya Cano.  Will decide on futher studies depending on what the test shows re chronic cough

## 2019-11-19 NOTE — TELEPHONE ENCOUNTER
Daughter, Lanny Gallo, would like to speak with Dr. Carolina Patricia about her breathing and changes since last visit. Please call [phone].

## 2019-11-25 ENCOUNTER — HOSPITAL ENCOUNTER (OUTPATIENT)
Dept: RESPIRATORY THERAPY | Age: 84
Discharge: HOME | End: 2019-11-25
Attending: INTERNAL MEDICINE
Payer: MEDICARE

## 2019-11-25 DIAGNOSIS — R06.09 DYSPNEA ON EXERTION: ICD-10-CM

## 2019-11-25 DIAGNOSIS — Z77.090 HISTORY OF ASBESTOS EXPOSURE: ICD-10-CM

## 2019-11-25 DIAGNOSIS — R05.3 CHRONIC COUGH: ICD-10-CM

## 2019-11-25 PROCEDURE — 74011250636 HC RX REV CODE- 250/636: Performed by: INTERNAL MEDICINE

## 2019-11-25 PROCEDURE — 95070 INHLJ BRNCL CHALLENGE TSTG: CPT

## 2019-11-26 ENCOUNTER — TELEPHONE (OUTPATIENT)
Dept: PULMONOLOGY | Age: 84
End: 2019-11-26

## 2019-11-26 NOTE — TELEPHONE ENCOUNTER
[facility]([phone]). PT WAS UNABLE TO DO METH CHALLENGE 11/25/19. PLEASE CHECK WITH DR CUTLER AS TO WHAT PT IS TO DO. PLEASE CALL BACK.

## 2019-11-26 NOTE — TELEPHONE ENCOUNTER
Daughter called back. Pt not able to do the Meth. Chol. Challenge test yesterday due to B/P so high. Per PCP did change her B/P meds and wants them to monitor x 2 weeks. Daughter wondering if she should do the next set they discussed which was a repeat CT scan and wait on the Meth. Chol Challenge test for later date.

## 2019-11-27 DIAGNOSIS — R05.3 CHRONIC COUGH: Primary | ICD-10-CM

## 2019-11-27 DIAGNOSIS — R06.02 SHORTNESS OF BREATH: ICD-10-CM

## 2019-11-27 NOTE — PROGRESS NOTES
CT chest ordered per family's wish due to ongoing coughing. Pt unable to do bronchial provocation testing due to elevated BP.       Mamta Castillo MD/MPH     Pulmonary, Critical Care Medicine  [facility]

## 2019-12-18 ENCOUNTER — HOSPITAL ENCOUNTER (OUTPATIENT)
Dept: CT IMAGING | Age: 84
Discharge: HOME | End: 2019-12-18
Attending: INTERNAL MEDICINE
Payer: MEDICARE

## 2019-12-18 DIAGNOSIS — R06.02 SHORTNESS OF BREATH: ICD-10-CM

## 2019-12-18 DIAGNOSIS — R05.3 CHRONIC COUGH: ICD-10-CM

## 2019-12-18 PROCEDURE — 71250 CT THORAX DX C-: CPT

## 2020-01-17 ENCOUNTER — OFFICE VISIT (OUTPATIENT)
Dept: PULMONOLOGY | Age: 85
End: 2020-01-17

## 2020-01-17 VITALS
TEMPERATURE: 95.5 F | OXYGEN SATURATION: 98 % | DIASTOLIC BLOOD PRESSURE: 81 MMHG | WEIGHT: 177.4 LBS | RESPIRATION RATE: 18 BRPM | SYSTOLIC BLOOD PRESSURE: 168 MMHG | BODY MASS INDEX: 31.43 KG/M2 | HEART RATE: 66 BPM | HEIGHT: 63 IN

## 2020-01-17 DIAGNOSIS — J30.89 ALLERGIC RHINITIS DUE TO OTHER ALLERGIC TRIGGER, UNSPECIFIED SEASONALITY: ICD-10-CM

## 2020-01-17 DIAGNOSIS — R05.3 CHRONIC COUGH: Primary | ICD-10-CM

## 2020-01-17 DIAGNOSIS — Z23 ENCOUNTER FOR IMMUNIZATION: ICD-10-CM

## 2020-01-17 RX ORDER — MONTELUKAST SODIUM 10 MG/1
10 TABLET ORAL DAILY
Qty: 90 TAB | Refills: 3 | Status: SHIPPED | OUTPATIENT
Start: 2020-01-17

## 2020-01-17 RX ORDER — IBUPROFEN 600 MG/1
600 TABLET ORAL
COMMUNITY
Start: 2019-12-10 | End: 2020-01-17

## 2020-01-17 NOTE — LETTER
1/27/20 Patient: Gaby Pineda YOB: 1935 Date of Visit: 1/17/2020 Shanika Carey MD 
[facility] [address] [address] VIA Facsimile: [phone] Dear Shanika Carey MD, Thank you for referring Ms. Alicia Daugherty to [address] [place] [address] for evaluation. My notes for this consultation are attached. If you have questions, please do not hesitate to call me. I look forward to following your patient along with you. Sincerely, Mariana Conde MD

## 2020-01-17 NOTE — PATIENT INSTRUCTIONS
Vaccine Information Statement Influenza (Flu) Vaccine (Inactivated or Recombinant): What You Need to Know Many Vaccine Information Statements are available in [place] and other languages. See www.immunize.org/vis Hojas de información sobre vacunas están disponibles en español y en muchos otros idiomas. Visite www.immunize.org/vis 1. Why get vaccinated? Influenza vaccine can prevent influenza (flu). Flu is a contagious disease that spreads around the [facility] every year, usually between October and May. Anyone can get the flu, but it is more dangerous for some people. Infants and young children, people 72years of age and older, pregnant women, and people with certain health conditions or a weakened immune system are at greatest risk of flu complications. Pneumonia, bronchitis, sinus infections and ear infections are examples of flu-related complications. If you have a medical condition, such as heart disease, cancer or diabetes, flu can make it worse. Flu can cause fever and chills, sore throat, muscle aches, fatigue, cough, headache, and runny or stuffy nose. Some people may have vomiting and diarrhea, though this is more common in children than adults. Each year thousands of people in the [facility] die from flu, and many more are hospitalized. Flu vaccine prevents millions of illnesses and flu-related visits to the doctor each year. 2. Influenza vaccines CDC recommends everyone 10months of age and older get vaccinated every flu season. Children 6 months through 6years of age may need 2 doses during a single flu season. Everyone else needs only 1 dose each flu season. It takes about 2 weeks for protection to develop after vaccination. There are many flu viruses, and they are always changing. Each year a new flu vaccine is made to protect against three or four viruses that are likely to cause disease in the upcoming flu season.  Even when the vaccine doesnt exactly match these viruses, it may still provide some protection. Influenza vaccine does not cause flu. Influenza vaccine may be given at the same time as other vaccines. 3. Talk with your health care provider Tell your vaccine provider if the person getting the vaccine: 
 Has had an allergic reaction after a previous dose of influenza vaccine, or has any severe, life-threatening allergies.  Has ever had Guillain-Barré Syndrome (also called GBS). In some cases, your health care provider may decide to postpone influenza vaccination to a future visit. People with minor illnesses, such as a cold, may be vaccinated. People who are moderately or severely ill should usually wait until they recover before getting influenza vaccine. Your health care provider can give you more information. 4. Risks of a reaction  Soreness, redness, and swelling where shot is given, fever, muscle aches, and headache can happen after influenza vaccine.  There may be a very small increased risk of Guillain-Barré Syndrome (GBS) after inactivated influenza vaccine (the flu shot). Tigist Gold children who get the flu shot along with pneumococcal vaccine (PCV13), and/or DTaP vaccine at the same time might be slightly more likely to have a seizure caused by fever. Tell your health care provider if a child who is getting flu vaccine has ever had a seizure. People sometimes faint after medical procedures, including vaccination. Tell your provider if you feel dizzy or have vision changes or ringing in the ears. As with any medicine, there is a very remote chance of a vaccine causing a severe allergic reaction, other serious injury, or death. 5. What if there is a serious problem? An allergic reaction could occur after the vaccinated person leaves the clinic.  If you see signs of a severe allergic reaction (hives, swelling of the face and throat, difficulty breathing, a fast heartbeat, dizziness, or weakness), call 9-1-1 and get the person to the nearest hospital. 
 
For other signs that concern you, call your health care provider. Adverse reactions should be reported to the Vaccine Adverse Event Reporting System (VAERS). Your health care provider will usually file this report, or you can do it yourself. Visit the VAERS website at www.vaers. hhs.gov or call 8-951.904.3657. VAERS is only for reporting reactions, and VAERS staff do not give medical advice. 6. The National Vaccine Injury Compensation Program 
 
The [facility] Vaccine Injury Compensation Program (VICP) is a federal program that was created to compensate people who may have been injured by certain vaccines. Visit the VICP website at www.hrsa.gov/vaccinecompensation or call 1-745.851.3136 to learn about the program and about filing a claim. There is a time limit to file a claim for compensation. 7. How can I learn more?  Ask your health care provider.  Call your local or state health department.  Contact the Centers for Disease Control and Prevention (CDC): 
- Call 8-341.669.2862 (6-513-UQD-INFO) or 
- Visit CDCs influenza website at www.cdc.gov/flu Vaccine Information Statement (Interim) Inactivated Influenza Vaccine 8/15/2019 
[address] [id number] Department of Health and [employer] Centers for Disease Control and Prevention Office Use Only

## 2020-01-17 NOTE — PROGRESS NOTES
[address] [place] [address]  [phone]    [facility]  Pulmonary, Critical Care, and Sleep Medicine    Pulmonary Office F/U  Name: Yessenia Liu 81 y.o. female  MRN: [id number]  : 1935  Service Date: 20  Chief Complaint:   Chief Complaint   Patient presents with    Cough     follow up from 10/11/2019    Breathing Problem     FOX    Other     history of asbestos exposure    Results     CT 2019       History of Present Illness:  (hx obtained by daughter and son, given patient's underlying dementia)  Yessenia Liu is a 81 y.o. female, who presents to Pulmonary clinic for followup of chronic cough. Pt was last seen on 10/11/2019. They report that in the interval, cough has decreased in the last two weeks. Patient was unable to have methacholine challenge test due to elevated blood pressure. Compliant with nasal sprays. Patient reports cough has gone down significantly, no sputum. Cough now does not occur in proximal-isms, occurs intermittently. Still no variation throughout the day. No other modifying factors. No aggravating, precipitating, alleviating factors. There are also no other associated symptoms. They report no changes to the patient's shortness of breath with strenuous exertion. Patient able to perform ADLs without difficulty. Patient only has issues when lifting items. Patient denies fevers, chills, night sweats, GERD, heartburn, nausea, vomiting, diarrhea, chest pain, voice hoarseness, aspiration, dysphagia.       Past Medical History:   Diagnosis Date    Alzheimer's disease ([facility] [address]     Anemia     Anemia NEC     Cataract     Glaucoma     Heart murmur     HTN (hypertension)     Hyperlipidemia     Myelofibrosis (HCC)     OA (osteoarthritis)     lumbar spine    Osteopenia        Past Surgical History:   Procedure Laterality Date    APPENDECTOMY      HX HYSTERECTOMY      vaginal       Family History   Problem Relation Age of Onset    Cancer Mother         colon    Anemia Maternal Aunt     Osteoporosis Maternal Grandmother      Social History     Socioeconomic History    Marital status:      Spouse name: Not on file    Number of children: Not on file    Years of education: Not on file    Highest education level: Not on file   Occupational History    Occupation: retired   Social Needs    Financial resource strain: Not on file    Food insecurity:     Worry: Not on file     Inability: Not on file   [employer] needs:     Medical: Not on file     Non-medical: Not on file   Tobacco Use    Smoking status: Never Smoker    Smokeless tobacco: Never Used   Substance and Sexual Activity    Alcohol use:  Yes     Alcohol/week: 0.8 standard drinks     Types: 1 Glasses of wine per week     Comment: occassional    Drug use: No    Sexual activity: Not on file   Lifestyle    Physical activity:     Days per week: Not on file     Minutes per session: Not on file    Stress: Not on file   Relationships    Social connections:     Talks on phone: Not on file     Gets together: Not on file     Attends Church service: Not on file     Active member of club or organization: Not on file     Attends meetings of clubs or organizations: Not on file     Relationship status: Not on file    Intimate partner violence:     Fear of current or ex partner: Not on file     Emotionally abused: Not on file     Physically abused: Not on file     Forced sexual activity: Not on file   Other Topics Concern    Not on file   Social History Narrative    Not on file     Allergies: I have reviewed the allergy hx  Allergies   Allergen Reactions    Aspirin Other (comments)     Upset stomach    Crestor [Rosuvastatin] Unknown (comments)    Namenda [Memantine] Unknown (comments)    Zocor [Simvastatin] Other (comments)     Cramps, weakness       Medications:  I have reviewed the patient's medications  Prior to Admission medications Medication Sig Start Date End Date Taking? Authorizing Provider   ibuprofen (MOTRIN) 600 mg tablet  12/10/19   Provider, Historical   memantine (NAMENDA) 10 mg tablet TAKE 1 TABLET BY MOUTH TWICE A DAY 9/7/19   Provider, Historical   BYSTOLIC 5 mg tablet TAKE 1 TABLET BY MOUTH EVERY DAY FOR 30 DAYS 10/1/19   Provider, Historical   traZODone (DESYREL) 50 mg tablet Take  by mouth nightly. Provider, Historical   azelastine (ASTELIN) 137 mcg (0.1 %) nasal spray 1 Blanch by Both Nostrils route two (2) times a day. Use in each nostril as directed 10/11/19   Rusty Cox MD   fluticasone propionate (FLONASE) 50 mcg/actuation nasal spray 2 Sprays by Both Nostrils route daily. 10/11/19   Rusty Cox MD   dronabinol (MARINOL) 5 mg capsule Take 1 Cap by mouth two (2) times a day. Max Daily Amount: 10 mg. 2/10/17   Wilma Cervantes MD   iron polysacch complex-b12-fa (FERREX 150 FORTE) capsule Take 1 Cap by mouth daily. 4/22/14   Racquel Gamboa NP   citalopram (CELEXA) 20 mg tablet Take  by mouth daily. Provider, Historical   ALPRAZolam (XANAX) 0.25 mg tablet Take 0.5-1 Tabs by mouth daily as needed. 9/6/13   Eber Paez MD   citalopram (CELEXA) 10 mg tablet Take 1 Tab by mouth daily. 9/6/13   Eber Paez MD   levothyroxine (SYNTHROID) 25 mcg tablet Take 1 Tab by mouth Daily (before breakfast). For Hypothyroidism (thyroid) 3/27/13   Kimberly Medina NP   ezetimibe (ZETIA) 10 mg tablet Take 1 Tab by mouth daily. For cholesterol 3/27/13   Kimberly Medina NP   donepezil (ARICEPT) 5 mg tablet Take 1 Tab by mouth nightly. For memory 3/27/13   Kimberly Medina NP   amLODIPine (NORVASC) 5 mg tablet Take 1 Tab by mouth daily. 3/27/13   Kimberly Medina, NP   calcium-cholecalciferol, D3, (CALCIUM 600 + D) tablet Take 1 Tab by mouth daily. 3/27/13   Kimberly Medina, LIZ   multivitamin (ONE A DAY) tablet Take 1 Tab by mouth daily. Provider, Historical   EPOETIN KAYLYN (PROCRIT CARLY) by Injection route as needed.  6/9/10 Provider, Historical       Immunizations:  I have reviewed the patient's immunizations  Immunization History   Administered Date(s) Administered    H1N1 Influenza Virus Vaccine 11/02/2009    Influenza Vaccine Whole 09/01/2009       Review of Systems:  A complete review of systems was performed as stated in the HPI, all others are negative. Objective:    Physical Exam:  /81 (BP 1 Location: Left arm, BP Patient Position: Sitting)   Pulse 66   Temp 95.5 °F (35.3 °C) (Oral)   Resp 18   Ht 5' 3\" (1.6 m)   Wt 80.5 kg (177 lb 6.4 oz)   SpO2 98%   BMI 31.42 kg/m²   Vitals were personally reviewed  Gen: no acute distress, pleasant and cooperative, sitting up in chair, able to climb to exam table w/o difficulty, ambulates slowly with walker  HEENT: normocephalic/atraumatic, PERRLA, EOMI, no scleral icterus, no oral lesions, poor dentition, Mallampati III  Neck: supple, trachea midline, no JVD, no cervical and supraclavicular adenopathy  CVS: regular rate rhythm, S1/S2, no murmurs/rubs/gallops  Lungs: good air entry B/L, CTABL, no wheezes/rales/rhonchi  Back: no kyphosis or scoliosis  Abdomen: soft, nontender, bowel sounds present, no hepatosplenomegaly  Ext: no pitting edema B/L, no peripheral cyanosis or clubbing  Neuro: grossly normal, AAOx3, normal strength and coordination grossly, no focal deficits  Skin: no rashes, erythema, lesions  Psych: poor memory    Labs:   I have reviewed the patient's available labs  Lab Results   Component Value Date/Time    WBC 2.8 (L) 06/19/2018 03:21 PM    HGB (POC) 11.0 (A) 04/22/2014 12:34 PM    HGB 11.7 (L) 06/19/2018 03:21 PM    HCT (POC) 35.7 (A) 04/22/2014 12:34 PM    HCT 34.6 (L) 06/19/2018 03:21 PM    PLATELET 148 03/42/9434 03:21 PM    MCV 76.5 06/19/2018 03:21 PM     Lab Results   Component Value Date/Time    Sodium 137 06/19/2018 03:21 PM    Potassium 4.1 06/19/2018 03:21 PM    Chloride 100 06/19/2018 03:21 PM    CO2 29 06/19/2018 03:21 PM    Anion gap 8 06/19/2018 03:21 PM    Glucose 83 06/19/2018 03:21 PM    BUN 16 06/19/2018 03:21 PM    Creatinine 0.79 06/19/2018 03:21 PM    BUN/Creatinine ratio 20 06/19/2018 03:21 PM    GFR est AA >60 06/19/2018 03:21 PM    GFR est non-AA >60 06/19/2018 03:21 PM    Calcium 8.5 06/19/2018 03:21 PM    Bilirubin, total 0.3 06/19/2018 03:21 PM    AST (SGOT) 16 06/19/2018 03:21 PM    Alk. phosphatase 76 06/19/2018 03:21 PM    Protein, total 7.3 06/19/2018 03:21 PM    Albumin 3.4 06/19/2018 03:21 PM    Globulin 3.9 06/19/2018 03:21 PM    A-G Ratio 0.9 06/19/2018 03:21 PM    ALT (SGPT) 19 06/19/2018 03:21 PM       Imaging:  I have personally reviewed patient's imaging as follows--CT chest without contrast from 12/18/2019 shows mainly clear lung fields bilaterally with some scattered subcentimeter nodules, calcified granulomas, calcified hilar lymph nodes along the left and subcarina, as well as pleural plaque along the left. No consolidations, infiltrates, masses, effusions were seen. Official report per radiology:  CT Results (most recent):  Results from [place] encounter on 12/18/19   CT CHEST WO CONT    Narrative CT chest without contrast    HISTORY: Persistent dry cough for several weeks    COMPARISON: 6/12/2018    TECHNIQUE: Axial imaging through the chest performed without intravenous  contrast. Coronal and sagittal reformation. All CT scans are performed using  dose optimization techniques as appropriate to the performed exam including the  following: Automated exposure control, adjustment of mA and/or kV according to  patient size, and use of iterative reconstructive technique. FINDINGS: Calcified granuloma anterior lateral left lower lobe. No other lung  nodule or mass. No acute consolidation. No bronchial wall thickening or  bronchiectasis. No endobronchial filling defects identified. Visualized thyroid gland unremarkable. Atherosclerotic calcification of the  aorta without gross aneurysmal dilatation. There are coronary artery  calcifications. Trace pericardial fluid. Small hiatal hernia. Calcified lymph nodes in the left inferior hilum and subcarinal space. No  effusion or pneumothorax. Low-attenuation lesion at the dome of the liver measuring 1.3 cm without  significant change. Probable vascular calcification at the splenic hilum    Degenerative change in the spine. Impression IMPRESSION:    No CT finding for an acute process of the chest.    Granulomatous disease of the left chest.    Atherosclerotic disease. Stable low-attenuation lesion at the liver dome previously evaluated and  characterized as a hemangioma. PFTs:  10/2019 reviewed: Spirometry is normal.  No bronchodilator effect seen. TTE:  I have reviewed the patient's TTE results  No results found for this or any previous visit. Assessment and Plan:  81 y.o. female with:    Impression:  1. Chronic cough: Given limited history, etiology unclear. Since patient improved on intranasal steroids and antihistamines, likely due to postnasal drip from allergic rhinitis. Cannot rule out LPR or upper airway cough syndrome or NAEB  2. Dyspnea on exertion: Symptoms are likely due to deconditioning given normal spirometry  3. History of asbestos exposure: Secondary exposure from her   4. Allergic rhinitis    Plan:  -I had a long discussion with the patient's daughter and son regarding potential work-up for chronic cough. I reviewed CT scan result with them indicating that there is no intraparenchymal or endobronchial lesion causing chronic cough. Will provide adjunctive therapy for postnasal drip to include Singulair 10 mg nightly. We will hold off on ENT or GI referral to rule out LPR.   -Continue Flonase 2 sprays to each nostril once daily  -Continue Astelin nasal spray 1 spray to each nostril twice daily  -We will hold off on empiric antihistamine therapy for upper airway cough syndrome given the patient's dementia, I am afraid antihistamine therapy may worsen dementia or lead to delirium. This was explained in great detail to the patient's family.  -Immunizations reviewed in clinic, influenza vaccination given today. They report patient declines pneumococcal vaccination, may be up-to-date, however they do not have records. Follow-up and Dispositions    · Return in about 4 months (around 5/17/2020).           Orders Placed This Encounter    Influenza Vaccine Inactivated (IIV)(FLUAD), Subunit, Adjuvanted, IM, (38762)    Administration fee () for Medicare insured patients    ibuprofen (MOTRIN) 600 mg tablet    montelukast (SINGULAIR) 10 mg tablet       Mariana Conde MD/MPH     Pulmonary, Critical Care Medicine  [facility]

## 2020-01-17 NOTE — PROGRESS NOTES
Alicia Daugherty presents today for   Chief Complaint   Patient presents with    Cough     follow up from 10/11/2019    Breathing Problem     FOX    Other     history of asbestos exposure    Results     CT 12/18/2019       Is someone accompanying this pt? Yes. Family members    Is the patient using any DME equipment during [facility] No    -DME Company N/A    Depression Screening:  3 most recent PHQ Screens 1/17/2020   Little interest or pleasure in doing things Not at all   Feeling down, depressed, irritable, or hopeless Not at all   Total Score PHQ 2 0       Learning Assessment:  Learning Assessment 1/17/2020   PRIMARY LEARNER Patient   PRIMARY LANGUAGE ENGLISH   LEARNER PREFERENCE PRIMARY DEMONSTRATION   ANSWERED BY Patient   RELATIONSHIP SELF       Abuse Screening:  Abuse Screening Questionnaire 1/17/2020   Do you ever feel afraid of your partner? N   Are you in a relationship with someone who physically or mentally threatens you? N   Is it safe for you to go home? Y       Fall Risk  Fall Risk Assessment, last 12 mths 1/17/2020   Able to walk? Yes   Fall in past 12 months? Yes   Fall with injury? No   Number of falls in past 12 months 1   Fall Risk Score 1         Coordination of Care:  1. Have you been to the ER, urgent care clinic since your last visit? Hospitalized since your last visit? No    2. Have you seen or consulted any other health care providers outside of the [address] [place] [address] since your last visit? Include any pap smears or colon screening. Yes. Dr. Karl Tellez, PCP, [facility] doctors                    Nicole Bentley is a 81 y.o. female who presents for routine immunizations. She denies any symptoms , reactions or allergies that would exclude them from being immunized today. Risks and adverse reactions were discussed and the VIS was given to them. All questions were addressed. She was observed for 10 min post injection. There were no reactions observed.     Daysi Castillo, LPN

## 2020-01-24 ENCOUNTER — HOSPITAL ENCOUNTER (OUTPATIENT)
Dept: ONCOLOGY | Age: 85
Discharge: HOME | End: 2020-01-24

## 2020-01-24 ENCOUNTER — OFFICE VISIT (OUTPATIENT)
Dept: ONCOLOGY | Age: 85
End: 2020-01-24

## 2020-01-24 VITALS
TEMPERATURE: 97.2 F | DIASTOLIC BLOOD PRESSURE: 69 MMHG | OXYGEN SATURATION: 98 % | HEART RATE: 75 BPM | SYSTOLIC BLOOD PRESSURE: 117 MMHG

## 2020-01-24 DIAGNOSIS — D64.9 ANEMIA, UNSPECIFIED TYPE: Primary | ICD-10-CM

## 2020-01-24 DIAGNOSIS — D64.9 ANEMIA, UNSPECIFIED TYPE: ICD-10-CM

## 2020-01-24 DIAGNOSIS — D47.1 CHRONIC MYELOPROLIFERATIVE DISORDER (HCC): ICD-10-CM

## 2020-01-24 DIAGNOSIS — G30.9 ALZHEIMER'S DEMENTIA WITHOUT BEHAVIORAL DISTURBANCE, UNSPECIFIED TIMING OF DEMENTIA ONSET: ICD-10-CM

## 2020-01-24 DIAGNOSIS — R53.82 CHRONIC FATIGUE: ICD-10-CM

## 2020-01-24 DIAGNOSIS — F02.80 ALZHEIMER'S DEMENTIA WITHOUT BEHAVIORAL DISTURBANCE, UNSPECIFIED TIMING OF DEMENTIA ONSET: ICD-10-CM

## 2020-01-24 LAB
BASO+EOS+MONOS # BLD AUTO: 0.4 K/UL (ref 0–2.3)
BASO+EOS+MONOS NFR BLD AUTO: 13 % (ref 0.1–17)
DIFFERENTIAL METHOD BLD: ABNORMAL
ERYTHROCYTE [DISTWIDTH] IN BLOOD BY AUTOMATED COUNT: 14.6 % (ref 11.5–14.5)
HCT VFR BLD AUTO: 37.5 % (ref 36–48)
HGB BLD-MCNC: 11.8 G/DL (ref 12–16)
LYMPHOCYTES # BLD: 1.1 K/UL (ref 1.1–5.9)
LYMPHOCYTES NFR BLD: 33 % (ref 14–44)
MCH RBC QN AUTO: 26.2 PG (ref 25–35)
MCHC RBC AUTO-ENTMCNC: 31.5 G/DL (ref 31–37)
MCV RBC AUTO: 83.1 FL (ref 78–102)
NEUTS SEG # BLD: 1.9 K/UL (ref 1.8–9.5)
NEUTS SEG NFR BLD: 55 % (ref 40–70)
PLATELET # BLD AUTO: 250 K/UL (ref 140–440)
RBC # BLD AUTO: 4.51 M/UL (ref 4.1–5.1)
WBC # BLD AUTO: 3.4 K/UL (ref 4.5–13)

## 2020-01-24 NOTE — PROGRESS NOTES
Hematology/Oncology  Progress Note    Name: Nicole Bentley  Date: 2020  : 1935    PCP: Florian Lugo MD     Ms. Adi Montano is a 81year old female who was seen for management of her myelofibrosis. Current therapy: Procrit at a dose of 60,000 units subcutaneous whenever the hematocrit is below 30%. Subjective:     Mrs. Adi Montano is an 81-year-old [facility] American woman who has a long-standing history of myelofibrosis. She also has slowly progressive Alzheimer's disease and is currently taking Aricept. She reports that her appetite has improved and she is continuing to gain weight now. She reports that her bowel habits are regular. She has not noticed any blood in her urine or stool. She denies fatigue, shortness of breath, and weakness. She denies chest pain or dizziness. She denies pain or any discomfort. She does not have any concerns or complaints to report at this time. Past medical history, family history, and social history: these were reviewed and remain unchanged.     Past Medical History:   Diagnosis Date    Alzheimer's disease ([facility] [address]     Anemia     Anemia NEC     Cataract     Glaucoma     Heart murmur     HTN (hypertension)     Hyperlipidemia     Myelofibrosis (HCC)     OA (osteoarthritis)     lumbar spine    Osteopenia      Past Surgical History:   Procedure Laterality Date    APPENDECTOMY      HX HYSTERECTOMY      vaginal     Social History     Socioeconomic History    Marital status:      Spouse name: Not on file    Number of children: Not on file    Years of education: Not on file    Highest education level: Not on file   Occupational History    Occupation: retired   Social Needs    Financial resource strain: Not on file    Food insecurity:     Worry: Not on file     Inability: Not on file   [email or web address] needs:     Medical: Not on file     Non-medical: Not on file   Tobacco Use    Smoking status: Never Smoker    Smokeless tobacco: Never Used   Substance and Sexual Activity    Alcohol use: Yes     Alcohol/week: 0.8 standard drinks     Types: 1 Glasses of wine per week     Comment: occassional    Drug use: No    Sexual activity: Not on file   Lifestyle    Physical activity:     Days per week: Not on file     Minutes per session: Not on file    Stress: Not on file   Relationships    Social connections:     Talks on phone: Not on file     Gets together: Not on file     Attends [employer] service: Not on file     Active member of club or organization: Not on file     Attends meetings of clubs or organizations: Not on file     Relationship status: Not on file    Intimate partner violence:     Fear of current or ex partner: Not on file     Emotionally abused: Not on file     Physically abused: Not on file     Forced sexual activity: Not on file   Other Topics Concern    Not on file   Social History Narrative    Not on file     Family History   Problem Relation Age of Onset    Cancer Mother         colon    Anemia Maternal Aunt     Osteoporosis Maternal Grandmother      Current Outpatient Medications   Medication Sig Dispense Refill    montelukast (SINGULAIR) 10 mg tablet Take 1 Tab by mouth daily. 90 Tab 3    memantine (NAMENDA) 10 mg tablet TAKE 1 TABLET BY MOUTH TWICE A DAY  3    BYSTOLIC 5 mg tablet TAKE 1 TABLET BY MOUTH EVERY DAY FOR 30 DAYS  0    traZODone (DESYREL) 50 mg tablet Take 50 mg by mouth nightly.  azelastine (ASTELIN) 137 mcg (0.1 %) nasal spray 1 [place] by Both Nostrils route two (2) times a day. Use in each nostril as directed 3 Bottle 3    fluticasone propionate (FLONASE) 50 mcg/actuation nasal spray 2 Sprays by Both Nostrils route daily. 3 Bottle 3    dronabinol (MARINOL) 5 mg capsule Take 1 Cap by mouth two (2) times a day. Max Daily Amount: 10 mg. 60 Cap 3    iron polysacch complex-b12-fa (FERREX 150 FORTE) capsule Take 1 Cap by mouth daily. 30 Cap 2    citalopram (CELEXA) 20 mg tablet Take 20 mg by mouth daily.       ALPRAZolam (XANAX) 0.25 mg tablet Take 0.5-1 Tabs by mouth daily as needed. (Patient taking differently: Take 0.125-0.25 mg by mouth nightly as needed.) 30 Tab 0    citalopram (CELEXA) 10 mg tablet Take 1 Tab by mouth daily. 30 Tab 1    levothyroxine (SYNTHROID) 25 mcg tablet Take 1 Tab by mouth Daily (before breakfast). For Hypothyroidism (thyroid) 30 Tab 6    ezetimibe (ZETIA) 10 mg tablet Take 1 Tab by mouth daily. For cholesterol 90 Tab 3    donepezil (ARICEPT) 5 mg tablet Take 1 Tab by mouth nightly. For memory 90 Tab 3    amLODIPine (NORVASC) 5 mg tablet Take 1 Tab by mouth daily. 90 Tab 3    calcium-cholecalciferol, D3, (CALCIUM 600 + D) tablet Take 1 Tab by mouth daily. 90 Tab 3    multivitamin (ONE A DAY) tablet Take 1 Tab by mouth daily.  EPOETIN KAYLYN (PROCRIT IJ) by Injection route as needed. Review of Systems  Constitutional: The patient has no acute distress or discomfort. HEENT: The patient denies recent head trauma, eye pain, blurred vision,  hearing deficit, oropharyngeal mucosal pain or lesions, and the patient denies throat pain or discomfort. Lymphatics: The patient denies palpable peripheral lymphadenopathy. Hematologic: The patient denies having bruising, bleeding, or progressive fatigue. Respiratory: Patient denies having shortness of breath, cough, sputum production, fever, or dyspnea on exertion. Cardiovascular: The patient denies having leg pain, leg swelling, heart palpitations, chest permit, chest pain, or lightheadedness. The patient denies having dyspnea on exertion. Gastrointestinal: The patient denies having nausea, emesis, or diarrhea. The patient denies having any hematemesis or blood in the stool. Genitourinary: Patient denies having urinary urgency, frequency, or dysuria. The patient denies having blood in the urine. Psychological: The patient denies having symptoms of nervousness, anxiety, depression, or thoughts of harming self.   Skin: Patient denies having skin rashes, skin, ulcerations, or unexplained itching or pruritus. Musculoskeletal: The patient denies having pain in the joints or bones. Neurologic: The patient has slowly progressive Alzheimer's disease. Objective:     Visit Vitals  /69   Pulse 75   Temp 97.2 °F (36.2 °C)   SpO2 98%     ECOG PS=1-2     Physical Exam:   Gen. Appearance: The patient is in no acute distress. Skin: There is no bruise or rash. HEENT: The exam is unremarkable. Neck: Supple without lymphadenopathy or thyromegaly. Lungs: Clear to auscultation and percussion; there are no wheezes or rhonchi. Heart: Regular rate and rhythm; there are no murmurs, gallops, or rubs. Anterior chest wall. Left breast: Deferred. Abdomen: Bowel sounds are present and normal.  There is no guarding, tenderness, or hepatosplenomegaly. Extremities: There is no clubbing, cyanosis, or edema. Neurologic: There are no focal neurologic deficits. Lymphatics: There is no palpable peripheral lymphadenopathy. Musculoskeletal: The patient has full range of motion at all joints. There is no evidence of joint deformity or effusions. There is no focal joint tenderness. Neurologic: The patient has slowly progressive Alzheimer's disease  Psychological/psychiatric: There is no clinical evidence of anxiety, depression, or melancholy. Lab data: The CBC from today shows that her WBC count 2.8, hemoglobin is 11.7 g/dL, hematocrit 36%, and the platelet count is 164,200.       Results for orders placed or performed during the hospital encounter of 01/24/20   CBC WITH 3 PART DIFF     Status: Abnormal   Result Value Ref Range Status    WBC 3.4 (L) 4.5 - 13.0 K/uL Final    RBC 4.51 4.10 - 5.10 M/uL Final    HGB 11.8 (L) 12.0 - 16.0 g/dL Final    HCT 37.5 36 - 48 % Final    MCV 83.1 78 - 102 FL Final    MCH 26.2 25.0 - 35.0 PG Final    MCHC 31.5 31 - 37 g/dL Final    RDW 14.6 (H) 11.5 - 14.5 % Final    PLATELET 330 759 - 946 K/uL Final    NEUTROPHILS 55 40 - 70 % Final    MIXED CELLS 13 0.1 - 17 % Final    LYMPHOCYTES 33 14 - 44 % Final    ABS. NEUTROPHILS 1.9 1.8 - 9.5 K/UL Final    ABS. MIXED CELLS 0.4 0.0 - 2.3 K/uL Final    ABS. LYMPHOCYTES 1.1 1.1 - 5.9 K/UL Final     Comment: Test performed at [address] [place] [address] or Outpatient Infusion Center Location. Reviewed by Medical Director. DF AUTOMATED   Final           Assessment:     1. Anemia, unspecified type    2. Chronic myeloproliferative disorder ([facility] [address]    3. Alzheimer's dementia without behavioral disturbance, unspecified timing of dementia onset ([facility]    4. Chronic fatigue      Plan:   Chronic myeloproliferative disorder/Anemia and myelofibrosis: The patient has slowly progressive myelofibrosis and associated anemia. I have informed the patient that the CBC from today reveals a mild leukopenia with a WBC count 3.4K/uL, hemoglobin is 11.8g/dL, hematocrit is 37.5%, and the platelet count is 186,859. A previous flow cytometry done on 4/1/2016 did not show any evidence of immunophenotypic abnormality. We will continue to monitor the patient every 12 weeks and if her hemoglobin should decline below 10g/dL and hematocrit below 30% she will be treated with Procrit at a dose of  60,000 units subcutaneous every 2 weeks. I advised her to take Slow Fe 1 tab PO daily. Rx was sent to her pharmacy. Iron Profile and Ferritin will be obtained at this time. Alzheimer's disease: The patient's family is providing a significant level of support and oversight. She will continue with her daily Aricept. She is also going to a [employer], on a daily basis as part of her treatment strategy. Chronic fatigue: Patient denies any fatigue at present. The patient and her family will continue the mild exercise program such as walking for short distances multiple times during the day to stimulate her metabolism.      I will have her return to the clinic for a complete assessment again in 12 weeks or sooner if indicated. Orders Placed This Encounter    COMPLETE CBC & AUTO DIFF WBC    InHouse CBC ([place])     Standing Status:   Future     Number of Occurrences:   1     Standing Expiration Date:   1/31/2020    IRON PROFILE     Standing Status:   Future     Standing Expiration Date:   2/36/4828    METABOLIC PANEL, COMPREHENSIVE     Standing Status:   Future     Standing Expiration Date:   1/24/2021    FERRITIN     Standing Status:   Future     Standing Expiration Date:   1/24/2021         Varsha Scherer, [facility]  01/24/2020    I have assessed the patient independently and  agree with the full assessment as outlined.   Ofelia Mcdonnell MD, Fani Lee

## 2020-01-24 NOTE — PATIENT INSTRUCTIONS
Complete Blood Count (CBC): About This Test  What is it? A complete blood count (CBC) is a blood test that gives important information about your blood cells, especially red blood cells, white blood cells, and platelets. Why is this test done? A CBC may be done as part of a regular physical exam. There are many other reasons that a doctor may want this blood test, including to:  · Find the cause of symptoms such as fatigue, weakness, fever, bruising, or weight loss. · Find anemia or an infection. · See how much blood has been lost if there is bleeding. · Diagnose diseases of the blood, such as leukemia or polycythemia. How can you prepare for the test?  You do not need to do anything before having this test.  What happens during the test?  The health professional taking a sample of your blood will:  · Wrap an elastic band around your upper arm. This makes the veins below the band larger so it is easier to put a needle into the vein. · Clean the needle site with alcohol. · Put the needle into the vein. · Attach a tube to the needle to fill it with blood. · Remove the band from your arm when enough blood is collected. · Put a gauze pad or cotton ball over the needle site as the needle is removed. · Put pressure on the site and then put on a bandage. If this blood test is done on a baby, a heel stick may be done instead of a blood draw from a vein. What happens after the test?  · You will probably be able to go home right away. · You can go back to your usual activities right away. Follow-up care is a key part of your treatment and safety. Be sure to make and go to all appointments, and call your doctor if you are having problems. It's also a good idea to keep a list of the medicines you take. Ask your doctor when you can expect to have your test results. Where can you learn more? Go to http://juwan-jose.info/.   Enter A871 in the search box to learn more about \"Complete Blood Count (CBC): About This Test.\"  Current as of: March 28, 2019  Content Version: 12.2  © 5248-8320 [employer]. Care instructions adapted under license by [employer] (which disclaims liability or warranty for this information). If you have questions about a medical condition or this instruction, always ask your healthcare professional. [address] any warranty or liability for your use of this information.

## 2020-01-27 LAB
ALBUMIN SERPL-MCNC: 3.9 G/DL (ref 3.6–4.6)
ALBUMIN/GLOB SERPL: 1.1 {RATIO} (ref 1.2–2.2)
ALP SERPL-CCNC: 67 IU/L (ref 39–117)
ALT SERPL-CCNC: 13 IU/L (ref 0–32)
AST SERPL-CCNC: 15 IU/L (ref 0–40)
BILIRUB SERPL-MCNC: 0.3 MG/DL (ref 0–1.2)
BUN SERPL-MCNC: 10 MG/DL (ref 8–27)
BUN/CREAT SERPL: 10 (ref 12–28)
CALCIUM SERPL-MCNC: 9.2 MG/DL (ref 8.7–10.3)
CHLORIDE SERPL-SCNC: 95 MMOL/L (ref 96–106)
CO2 SERPL-SCNC: 24 MMOL/L (ref 20–29)
CREAT SERPL-MCNC: 0.96 MG/DL (ref 0.57–1)
FERRITIN SERPL-MCNC: 625 NG/ML (ref 15–150)
GLOBULIN SER CALC-MCNC: 3.4 G/DL (ref 1.5–4.5)
GLUCOSE SERPL-MCNC: 98 MG/DL (ref 65–99)
IRON SATN MFR SERPL: 25 % (ref 15–55)
IRON SERPL-MCNC: 59 UG/DL (ref 27–139)
POTASSIUM SERPL-SCNC: 4 MMOL/L (ref 3.5–5.2)
PROT SERPL-MCNC: 7.3 G/DL (ref 6–8.5)
SODIUM SERPL-SCNC: 136 MMOL/L (ref 134–144)
TIBC SERPL-MCNC: 238 UG/DL (ref 250–450)
UIBC SERPL-MCNC: 179 UG/DL (ref 118–369)

## 2020-04-24 ENCOUNTER — VIRTUAL VISIT (OUTPATIENT)
Dept: ONCOLOGY | Age: 85
End: 2020-04-24

## 2020-04-24 VITALS — HEIGHT: 63 IN | BODY MASS INDEX: 31.42 KG/M2

## 2020-04-24 DIAGNOSIS — D75.81 MYELOFIBROSIS (HCC): ICD-10-CM

## 2020-04-24 DIAGNOSIS — D47.1 CHRONIC MYELOPROLIFERATIVE DISORDER (HCC): Primary | ICD-10-CM

## 2020-04-24 NOTE — PROGRESS NOTES
Hematology/medical oncology progress note    4/24/2020  Susie Julius Goldmann is a 81 y.o. female evaluated via telephone on 4/24/2020. YOB: 1935    Diagnosis: Myelofibrosis with associated anemia and chronic myeloproliferative disorder    Consent:  She and/or health care decision maker is aware that she may receive a bill for this telephone service, depending on her insurance coverage, and has provided verbal consent to proceed: YES      Documentation:  I communicated with the patient and/or health care decision maker about long-term management of her myeloproliferative disorder/myelofibrosis with associated anemia. I explained to the patient and her family at her last lab test from 1/24/2020 showed a WBC count of 3.4, hemoglobin was 11.8 g/dL with hematocrit of 37.5%. Her platelet count was normal at 20 50,000. The comprehensive metabolic panel revealed that her kidney function is excellent with a BUN of 10 and a creatinine of 0.96 mg/dL. Liver function enzymes were within normal limits. The iron profile showed iron was 59/dL with an iron saturation of 25%. Ferritin level was 625 ng/mL. We will tentatively plan to schedule the patient for clinic based lab test to be done in about 8 weeks. The comprehensive metabolic panel, CBC, iron profile, and ferritin levels will be requested. The patient and her family had their questions answered to their satisfaction. Total time 25 minutes, greater than 50% of the time was in counseling and coordination of care. I AFFIRM this is a Patient Initiated Episode with an Established Patient who has not had a related appointment within my department in the past 7 days or scheduled within the next 24 hours. Total Time: Total time 25 minutes, greater than 50% of the time was in counseling and coordination of care.     Note: not billable if this call serves to triage the patient into an appointment for the relevant concern      Cheryl Torres MD

## 2020-04-28 ENCOUNTER — TELEPHONE (OUTPATIENT)
Dept: PULMONOLOGY | Age: 85
End: 2020-04-28

## 2020-04-28 NOTE — TELEPHONE ENCOUNTER
Spoke with daughter who lives in [place]. Pt is due in mid May for her 4 mo f/u for cough and pt is still having the cough that she would like to schedule a Virtual visit.  Daughter transferred to front to schedule

## 2020-04-28 NOTE — TELEPHONE ENCOUNTER
Pt's daughter, edel allen [facility]([phone]). Pt wants to talk to Dr Rey Morrow regarding her mother's condition and what the next plan for treatment. Pt has alzheimers and cannot do virtual appointments. Please call her back.

## 2020-05-18 ENCOUNTER — VIRTUAL VISIT (OUTPATIENT)
Dept: PULMONOLOGY | Age: 85
End: 2020-05-18

## 2020-05-18 DIAGNOSIS — R05.3 CHRONIC COUGH: Primary | ICD-10-CM

## 2020-05-18 DIAGNOSIS — R06.09 DYSPNEA ON EXERTION: ICD-10-CM

## 2020-05-18 DIAGNOSIS — Z91.89 SEDENTARY LIFESTYLE: ICD-10-CM

## 2020-05-18 DIAGNOSIS — Z77.090 HISTORY OF ASBESTOS EXPOSURE: ICD-10-CM

## 2020-05-18 RX ORDER — AZELASTINE 1 MG/ML
1 SPRAY, METERED NASAL 2 TIMES DAILY
Qty: 3 BOTTLE | Refills: 3 | Status: SHIPPED | OUTPATIENT
Start: 2020-05-18

## 2020-05-18 NOTE — PROGRESS NOTES
[address] [place] [address] [facility]  [phone]    [facility]  Pulmonary, Critical Care, and Sleep Medicine    Pulmonary Office F/U  Name: Parmjit Hand 81 y.o. female  MRN: [id number]  : 1935  Service Date: 20  Chief Complaint:   Chief Complaint   Patient presents with    Cough       Parmjit Hand is a 81 y.o. female who was seen by synchronous (real-time) audio-video technology on 2020. Consent:  She and/or her healthcare decision maker is aware that this patient-initiated Telehealth encounter is a billable service, with coverage as determined by her insurance carrier. She is aware that she may receive a bill and has provided verbal consent to proceed: Yes    I was in the office while conducting this encounter. History of Present Illness:  (hx obtained by daughter and son, given patient's underlying dementia)  Parmjit Hand is a 81 y.o. female, who presents to Pulmonary clinic for followup of chronic cough. Pt was last seen on 2020. In the interval, patient's family reports that her cough has recurred since the change of season. They report that her cough also occurs mainly when the heater turns on. They report that they had the doctor work underneath the house fixed and found a significant amount of mold that needed to be remediated. They report that since then, they have been changing the filters to the HVAC system monthly, but patient coughs every time heater comes on. They report that the patient is sedentary. They report the patient is not using nasal sprays, fluticasone or azelastine. No other modifying factors. They also report that the patient's son who lives in the house also smokes in the house. They report no changes to the patient's shortness of breath with exertion.   They report that the patient is sedentary, but gets dyspneic when ambulating around the house or outside of the house, also can get short of breath going to the bathroom and showering. They report symptoms resolve with rest.  No other modifying factors. Patient denies fevers, chills, night sweats, GERD, heartburn, nausea, vomiting, diarrhea, chest pain, voice hoarseness, aspiration, dysphagia. Past Medical History:   Diagnosis Date    Alzheimer's disease ([facility] [address]     Anemia     Anemia NEC     Cataract     Glaucoma     Heart murmur     HTN (hypertension)     Hyperlipidemia     Myelofibrosis (HCC)     OA (osteoarthritis)     lumbar spine    Osteopenia        Past Surgical History:   Procedure Laterality Date    APPENDECTOMY      HX HYSTERECTOMY      vaginal       Family History   Problem Relation Age of Onset    Cancer Mother         colon    Anemia Maternal Aunt     Osteoporosis Maternal Grandmother      Social History     Socioeconomic History    Marital status:      Spouse name: Not on file    Number of children: Not on file    Years of education: Not on file    Highest education level: Not on file   Occupational History    Occupation: retired   Social Needs    Financial resource strain: Not on file    Food insecurity     Worry: Not on file     Inability: Not on file   [employer] needs     Medical: Not on file     Non-medical: Not on file   Tobacco Use    Smoking status: Never Smoker    Smokeless tobacco: Never Used   Substance and Sexual Activity    Alcohol use:  Yes     Alcohol/week: 0.8 standard drinks     Types: 1 Glasses of wine per week     Comment: occassional    Drug use: No    Sexual activity: Not on file   Lifestyle    Physical activity     Days per week: Not on file     Minutes per session: Not on file    Stress: Not on file   Relationships    Social connections     Talks on phone: Not on file     Gets together: Not on file     Attends Sikh service: Not on file     Active member of club or organization: Not on file     Attends meetings of clubs or organizations: Not on file     Relationship status: Not on file    Intimate partner violence     Fear of current or ex partner: Not on file     Emotionally abused: Not on file     Physically abused: Not on file     Forced sexual activity: Not on file   Other Topics Concern    Not on file   Social History Narrative    Not on file     Allergies: I have reviewed the allergy hx  Allergies   Allergen Reactions    Aspirin Other (comments)     Upset stomach    Crestor [Rosuvastatin] Unknown (comments)    Namenda [Memantine] Unknown (comments)    Zocor [Simvastatin] Other (comments)     Cramps, weakness       Medications:  I have reviewed the patient's medications  Prior to Admission medications    Medication Sig Start Date End Date Taking? Authorizing Provider   montelukast (SINGULAIR) 10 mg tablet Take 1 Tab by mouth daily. 1/17/20  Yes Des Cary MD   memantine (NAMENDA) 10 mg tablet TAKE 1 TABLET BY MOUTH TWICE A DAY 9/7/19  Yes Provider, Historical   BYSTOLIC 5 mg tablet TAKE 1 TABLET BY MOUTH EVERY DAY FOR 30 DAYS 10/1/19  Yes Provider, Historical   traZODone (DESYREL) 50 mg tablet Take 50 mg by mouth nightly. Yes Provider, Historical   fluticasone propionate (FLONASE) 50 mcg/actuation nasal spray 2 Sprays by Both Nostrils route daily. 10/11/19  Yes Des Cary MD   levothyroxine (SYNTHROID) 25 mcg tablet Take 1 Tab by mouth Daily (before breakfast). For Hypothyroidism (thyroid) 3/27/13  Yes Marine Peck NP   ezetimibe (ZETIA) 10 mg tablet Take 1 Tab by mouth daily. For cholesterol 3/27/13  Yes Marine Peck NP   donepezil (ARICEPT) 5 mg tablet Take 1 Tab by mouth nightly. For memory 3/27/13  Yes Marine Peck NP   amLODIPine (NORVASC) 5 mg tablet Take 1 Tab by mouth daily. 3/27/13  Yes Marine Peck NP   ferrous sulfate (SLOW FE) 142 mg (45 mg iron) ER tablet Take 1 Tab by mouth Daily (before breakfast). 1/24/20   Elvira Hutchins NP   azelastine (ASTELIN) 137 mcg (0.1 %) nasal spray 1 [place] by Both Nostrils route two (2) times a day. Use in each nostril as directed 10/11/19   Tariq Gallego MD   dronabinol (MARINOL) 5 mg capsule Take 1 Cap by mouth two (2) times a day. Max Daily Amount: 10 mg. 2/10/17   Mercedes Patel MD   iron polysacch complex-b12-fa (FERREX 150 FORTE) capsule Take 1 Cap by mouth daily. 4/22/14   Cooper, Bettina Veliz, NP   citalopram (CELEXA) 20 mg tablet Take 20 mg by mouth daily. Provider, Historical   ALPRAZolam (XANAX) 0.25 mg tablet Take 0.5-1 Tabs by mouth daily as needed. Patient taking differently: Take 0.125-0.25 mg by mouth nightly as needed. 9/6/13   Delia Sylvester MD   citalopram (CELEXA) 10 mg tablet Take 1 Tab by mouth daily. 9/6/13   Delia Sylvester MD   calcium-cholecalciferol, D3, (CALCIUM 600 + D) tablet Take 1 Tab by mouth daily. 3/27/13   Duane Blare., NP   multivitamin (ONE A DAY) tablet Take 1 Tab by mouth daily. Provider, Historical   EPOETIN KAYLYN (PROCRIT IJ) by Injection route as needed. 6/9/10   Provider, Historical       Immunizations:  I have reviewed the patient's immunizations  Immunization History   Administered Date(s) Administered    H1N1 Influenza Virus Vaccine 11/02/2009    Influenza Vaccine (Tri) Adjuvanted 01/17/2020    Influenza Vaccine Whole 09/01/2009       Review of Systems:  A complete review of systems was performed as stated in the HPI, all others are negative. Objective:    Physical Exam:  There were no vitals taken for this visit.   Vitals were personally reviewed  This is a virtual visit    Constitutional: [x] Appears well-developed and well-nourished [x] No apparent distress      [] Abnormal  (Limited exam due to video visit)    Mental status: [x] Alert and awake  [] Oriented to person/place/time [x] Able to follow commands    [x] Abnormal -patient has underlying dementia    Eyes:   EOM    [x]  Normal    [] Abnormal -   Sclera  [x]  Normal    [] Abnormal -          Discharge [x]  None visible   [] Abnormal   (limited exam due to video visit)     HENT: [x] Normocephalic, atraumatic, extra ocular movement appears intact, nasal bridge midline, no nasal drainage   [] Abnormal  [x] Mouth/Throat: Mucous membranes are moist    External Ears [x] Normal, no drainage or discharge[] Abnormal -  (limited exam due to video visit)     Neck: [x] No visualized mass, trachea midline [] Abnormal -   (limited exam due to video visit)     Pulmonary/Chest: [x] Respiratory effort normal   [x] No visualized signs of difficulty breathing or respiratory distress        [] Abnormal  (limited exam due to video visit)      Musculoskeletal:   [x] Normal movement of arms with no signs of ataxia         [x] Normal range of motion of neck        [] Abnormal  (limited exam due to video visit)     Neurological:        [x] No Facial Asymmetry (Cranial nerve 7 motor function) (limited exam due to video visit)          [x] No gaze palsy        [] Abnormal  (limited exam due to video visit)         Skin:        [x] No significant exanthematous lesions or discoloration noted on facial skin         [] Abnormal   (limited exam due to video visit)            Psychiatric:       [x] Normal Affect, thought content  [x] Abnormal:  poor memory  [x] No Hallucinations    Labs:   I have reviewed the patient's available labs  Lab Results   Component Value Date/Time    WBC 3.4 (L) 01/24/2020 02:36 PM    HGB (POC) 11.0 (A) 04/22/2014 12:34 PM    HGB 11.8 (L) 01/24/2020 02:36 PM    HCT (POC) 35.7 (A) 04/22/2014 12:34 PM    HCT 37.5 01/24/2020 02:36 PM    PLATELET 650 39/67/4668 02:36 PM    MCV 83.1 01/24/2020 02:36 PM     Lab Results   Component Value Date/Time    Sodium 136 01/24/2020 02:36 AM    Potassium 4.0 01/24/2020 02:36 AM    Chloride 95 (L) 01/24/2020 02:36 AM    CO2 24 01/24/2020 02:36 AM    Anion gap 8 06/19/2018 03:21 PM    Glucose 98 01/24/2020 02:36 AM    BUN 10 01/24/2020 02:36 AM    Creatinine 0.96 01/24/2020 02:36 AM    BUN/Creatinine ratio 10 (L) 01/24/2020 02:36 AM    GFR est AA 63 01/24/2020 02:36 AM GFR est non-AA 55 (L) 01/24/2020 02:36 AM    Calcium 9.2 01/24/2020 02:36 AM    Bilirubin, total 0.3 01/24/2020 02:36 AM    AST (SGOT) 15 01/24/2020 02:36 AM    Alk. phosphatase 67 01/24/2020 02:36 AM    Protein, total 7.3 01/24/2020 02:36 AM    Albumin 3.9 01/24/2020 02:36 AM    Globulin 3.9 06/19/2018 03:21 PM    A-G Ratio 1.1 (L) 01/24/2020 02:36 AM    ALT (SGPT) 13 01/24/2020 02:36 AM       Imaging:  I have personally reviewed patient's imaging as follows--no new imaging in the interval  **CT chest without contrast from 12/18/2019 shows mainly clear lung fields bilaterally with some scattered subcentimeter nodules, calcified granulomas, calcified hilar lymph nodes along the left and subcarina, as well as pleural plaque along the left. No consolidations, infiltrates, masses, effusions were seen. Official report per radiology:  CT Results (most recent):  Results from [place] encounter on 12/18/19   CT CHEST WO CONT    Narrative CT chest without contrast    HISTORY: Persistent dry cough for several weeks    COMPARISON: 6/12/2018    TECHNIQUE: Axial imaging through the chest performed without intravenous  contrast. Coronal and sagittal reformation. All CT scans are performed using  dose optimization techniques as appropriate to the performed exam including the  following: Automated exposure control, adjustment of mA and/or kV according to  patient size, and use of iterative reconstructive technique. FINDINGS: Calcified granuloma anterior lateral left lower lobe. No other lung  nodule or mass. No acute consolidation. No bronchial wall thickening or  bronchiectasis. No endobronchial filling defects identified. Visualized thyroid gland unremarkable. Atherosclerotic calcification of the  aorta without gross aneurysmal dilatation. There are coronary artery  calcifications. Trace pericardial fluid. Small hiatal hernia. Calcified lymph nodes in the left inferior hilum and subcarinal space. No  effusion or pneumothorax. Low-attenuation lesion at the dome of the liver measuring 1.3 cm without  significant change. Probable vascular calcification at the splenic hilum    Degenerative change in the spine. Impression IMPRESSION:    No CT finding for an acute process of the chest.    Granulomatous disease of the left chest.    Atherosclerotic disease. Stable low-attenuation lesion at the liver dome previously evaluated and  characterized as a hemangioma. PFTs:  10/2019 reviewed: Spirometry is normal.  No bronchodilator effect seen. TTE:  I have reviewed the patient's TTE results  No results found for this or any previous visit. Assessment and Plan:  81 y.o. female with:    Impression:  1. Chronic cough: Symptoms recurred, etiology most likely due to allergic rhinitis given worsening cough with dust exposure. Given age and poor symptomatology based on patient's underlying dementia, cannot rule out LPR or upper airway cough syndrome  2. Dyspnea on exertion: Symptoms are likely due to deconditioning given normal spirometry  3. History of asbestos exposure: Secondary exposure from her . CT chest from 12/2019 negative for malignancy  4. Allergic rhinitis    Plan:  -Restart Flonase 2 sprays to each nostril once daily, and Astelin nasal spray 1 spray to each nostril twice daily. Continue Singulair 10 mg nightly  -Advised daughters, one of them present with patient in one via telephone patched in via speaker phone to perform home remediation, specifically with duct work from [employer] given that patient has significant coughing paroxysms when heating is turned on.   Also advised to have family members living in the house avoid smoking and have house immediately cleaned after smoking has been stopped completely.  -Advised family that if symptoms persist after 3 months, then to contact our clinic back and I will refer them to ENT for work-up of LPR and for allergy testing and possible SCIT therapy.  -We will hold off on empiric antihistamine therapy for upper airway cough syndrome given the patient's dementia, I am afraid antihistamine therapy may worsen dementia or lead to delirium. This was explained in great detail to the patient's family.  -Immunizations reviewed in clinic, influenza vaccination up-to-date, patient and family do not have pneumococcal vaccination records, may be up-to-date, however they do not have records. -Advised to start graded exercise to improve patient's exercise tolerance given shortness of breath with mild exertion. Patient sedentary lifestyle likely worsens her shortness of breath. Follow-up and Dispositions    · Return in about 1 year (around 5/18/2021). Orders Placed This Encounter    azelastine (ASTELIN) 137 mcg (0.1 %) nasal spray     40 minutes were spent in this patient encounter with greater than 50% that time spent face-to-face counseling her as above. Spoke with 2 daughters (1 at the house and one via speaker phone during video conference) and 1 son, I went over all of their questions with regards to patient's cough. Reviewed necessity for home remediation, also spent time going over which pharmacy to send medications to. Also advised him that if her symptoms persist, will refer the patient to ENT. Went over the role of allergy testing. Also reviewed questions regarding patient's antihypertensive medication contributing to patient's cough. Daughter present had questions regarding cytokine release with regards to amlodipine use. We discussed the expected course, resolution and complications of the diagnosis(es) in detail. Medication risks, benefits, costs, interactions, and alternatives were discussed as indicated. I advised her to contact the office if her condition worsens, changes or fails to improve as anticipated. She expressed understanding with the diagnosis(es) and plan.      Pursuant to the emergency declaration under the [facility] waiver authority and the [employer] Act, this Virtual  Visit was conducted, with patient's consent, to reduce the patient's risk of exposure to COVID-19 and provide continuity of care for an established patient. Services were provided through a video synchronous discussion virtually to substitute for in-person clinic visit.     Shimon Layne MD/MPH     Pulmonary, Critical Care Medicine  [facility]

## 2021-03-24 ENCOUNTER — HOSPITAL ENCOUNTER (OUTPATIENT)
Dept: GENERAL RADIOLOGY | Age: 86
Discharge: HOME | End: 2021-03-24
Payer: MEDICARE

## 2021-03-24 ENCOUNTER — TRANSCRIBE ORDER (OUTPATIENT)
Dept: REGISTRATION | Age: 86
End: 2021-03-24

## 2021-03-24 DIAGNOSIS — M79.671 RIGHT FOOT PAIN: ICD-10-CM

## 2021-03-24 DIAGNOSIS — M79.671 RIGHT FOOT PAIN: Primary | ICD-10-CM

## 2021-03-24 PROCEDURE — 73610 X-RAY EXAM OF ANKLE: CPT

## 2021-03-24 PROCEDURE — 73630 X-RAY EXAM OF FOOT: CPT

## 2021-03-30 ENCOUNTER — OFFICE VISIT (OUTPATIENT)
Dept: ORTHOPEDIC SURGERY | Age: 86
End: 2021-03-30
Payer: MEDICARE

## 2021-03-30 VITALS
WEIGHT: 164.4 LBS | HEART RATE: 56 BPM | HEIGHT: 63 IN | OXYGEN SATURATION: 100 % | BODY MASS INDEX: 29.13 KG/M2 | TEMPERATURE: 97.5 F

## 2021-03-30 DIAGNOSIS — S92.001A CLOSED NONDISPLACED FRACTURE OF RIGHT CALCANEUS, UNSPECIFIED PORTION OF CALCANEUS, INITIAL ENCOUNTER: Primary | ICD-10-CM

## 2021-03-30 DIAGNOSIS — S92.351A CLOSED DISPLACED FRACTURE OF FIFTH METATARSAL BONE OF RIGHT FOOT, INITIAL ENCOUNTER: ICD-10-CM

## 2021-03-30 PROCEDURE — G8536 NO DOC ELDER MAL SCRN: HCPCS | Performed by: ORTHOPAEDIC SURGERY

## 2021-03-30 PROCEDURE — 28470 CLTX METATARSAL FX WO MNP EA: CPT | Performed by: ORTHOPAEDIC SURGERY

## 2021-03-30 PROCEDURE — 28400 CLTX CALCANEAL FX W/O MNPJ: CPT | Performed by: ORTHOPAEDIC SURGERY

## 2021-03-30 PROCEDURE — 99204 OFFICE O/P NEW MOD 45 MIN: CPT | Performed by: ORTHOPAEDIC SURGERY

## 2021-03-30 PROCEDURE — G8419 CALC BMI OUT NRM PARAM NOF/U: HCPCS | Performed by: ORTHOPAEDIC SURGERY

## 2021-03-30 PROCEDURE — G8427 DOCREV CUR MEDS BY ELIG CLIN: HCPCS | Performed by: ORTHOPAEDIC SURGERY

## 2021-03-30 PROCEDURE — 1101F PT FALLS ASSESS-DOCD LE1/YR: CPT | Performed by: ORTHOPAEDIC SURGERY

## 2021-03-30 PROCEDURE — G8756 NO BP MEASURE DOC: HCPCS | Performed by: ORTHOPAEDIC SURGERY

## 2021-03-30 PROCEDURE — G8399 PT W/DXA RESULTS DOCUMENT: HCPCS | Performed by: ORTHOPAEDIC SURGERY

## 2021-03-30 PROCEDURE — G8510 SCR DEP NEG, NO PLAN REQD: HCPCS | Performed by: ORTHOPAEDIC SURGERY

## 2021-03-30 PROCEDURE — 1090F PRES/ABSN URINE INCON ASSESS: CPT | Performed by: ORTHOPAEDIC SURGERY

## 2021-03-30 NOTE — PROGRESS NOTES
AMBULATORY PROGRESS NOTE      Patient: Binh Hackett             MRN: [id number]     SSN: [id number] Body mass index is 29.12 kg/m². YOB: 1935     AGE: 81 y.o. EX: female    PCP: Munira Casarez MD       IMPRESSION //  DIAGNOSIS AND TREATMENT PLAN        Alicia Daugherty has a closed right right calcaneal superior calcaneal tuberosity fracture as well as a minimally displaced, right fifth metatarsal base fracture with associated osteopenia and osteoporosis. She also has a second nondisplaced avulsion fracture of the distal lateral calcaneus. She is here with her daughter today. Because of her    Advanced age, and osteopenia osteoporosis, recommendation is protection in a cam walker boot. 3-week fall, x-rays of her right foot, upon next visit 3 views. These fractures will take upwards of 3 months to heal.    Currently this fracture injury occurred several weeks prior to her presenting to her PCP and for several weeks prior to her having the x-ray that was done on March 24, 2021. DIAGNOSES  1. Closed nondisplaced fracture of right calcaneus, unspecified portion of calcaneus, initial encounter    2. Closed displaced fracture of fifth metatarsal bone of right foot, initial encounter        Orders Placed This Encounter    Generic Supply Order     Short CAM walker boot    CLOSED [address]        PLAN:    1. I will provide her a short CAM walker boot  2. I will advise her to use OTC Tylenol to manage her pain      RTO-  3 weeks    Alicia Daugherty  expresses understanding of the diagnosis, treatment plan, and all of their proposed questions were answered to their satisfaction. Patient education has been provided re the diagnoses. Alicia Daugherty may have a reminder for a \"due or due soon\" health maintenance. I have asked that she contact her primary care provider for follow-up on this health maintenance.         HPI //  OBJECTIVE EXAMINATION        Alicia Daugherty IS A 81 y.o. female who is a/an  new patient, presenting to my outpatient office for evaluation of  the following chief complaint(s):     Chief Complaint   Patient presents with    Foot Pain     right foot       Pt's daughter fell when she was in their den approimately 2.5 weeks ago. She went to the ED on 3/24/2021 and had XRs done on her right ankle and has been ambulating with a rolling walker ever since. Visit Vitals  Pulse (!) 56   Temp 97.5 °F (36.4 °C) (Temporal)   Ht 5' 3\" (1.6 m)   Wt 164 lb 6.4 oz (74.6 kg)   SpO2 100%   BMI 29.12 kg/m²       Appearance: Alert, well appearing and pleasant patient who is in no distress, oriented to person, place/time, and who follows commands. This patient is accompanied in the examination room by her  daughter. There is signs of: no dementia  Psychiatric: Affect/mood are appropriate. Speech normal in context and clarity, memory intact grossly, no involuntary movements - tremors. Patient arrives to office via: with assistive device: rolling walker. H EENT (2): Head normocephalic & atraumatic. Both pupils are round     Eye: EOM are intact // Neck: ROM WNL  //  Hearings Intact   Respiratory: Breathing non labored     ANKLE/FOOT right    Gait: uses assistive device   Tenderness: mild over the superior calcaneal tuberosity, 5th metatarsal base   Cutaneous: swelling posterolateral heel, resolving bruise to the anterolateral distal tibia  Joint Motion: Functional ROM, but ankle not stressed due to her fracture types  Joint / Tendon Stability: Not tested due to injury  Alignment: neutral Hindfoot    Neuro Motor/Sensory: NL/NL  Vascular: NL foot/ankle pulses,   Lymphatics: No extremity lymphedema, No calf swelling, no tenderness to calf muscles. CHART REVIEW     Alicia Daugherty has been experiencing pain and discomfort confirmed as outlined in the pain assessment outlined below.  was reviewed by Jason Arellano MD on 3/30/2021. Pain Assessment  3/30/2021   Location of Pain Foot   Location Modifiers Right   Severity of Pain 6   Quality of Pain Aching   Duration of Pain A few minutes   Frequency of Pain Intermittent   Date Pain First Started 3/6/2021   Aggravating Factors Walking;Standing   Limiting Behavior Some   Relieving Factors Rest   Result of Injury Yes   Work-Related Injury No   Type of Injury Fall        Alicia Daugherty  has a past medical history of Alzheimer's disease ([facility] [address] Anemia, Anemia NEC, Cataract, Glaucoma, Heart murmur, HTN (hypertension), Hyperlipidemia, Myelofibrosis ([address] OA (osteoarthritis), and Osteopenia. Patients is employed at:         Past Medical History:   Diagnosis Date    Alzheimer's disease ([facility] [address]     Anemia     Anemia NEC     Cataract     Glaucoma     Heart murmur     HTN (hypertension)     Hyperlipidemia     Myelofibrosis (HCC)     OA (osteoarthritis)     lumbar spine    Osteopenia      Past Surgical History:   Procedure Laterality Date    HX HYSTERECTOMY      vaginal    NC APPENDECTOMY       Current Outpatient Medications   Medication Sig    memantine (NAMENDA) 10 mg tablet TAKE 1 TABLET BY MOUTH TWICE A DAY    BYSTOLIC 5 mg tablet TAKE 1 TABLET BY MOUTH EVERY DAY FOR 30 DAYS    traZODone (DESYREL) 50 mg tablet Take 50 mg by mouth nightly.  dronabinol (MARINOL) 5 mg capsule Take 1 Cap by mouth two (2) times a day. Max Daily Amount: 10 mg.    iron polysacch complex-b12-fa (FERREX 150 FORTE) capsule Take 1 Cap by mouth daily.  citalopram (CELEXA) 20 mg tablet Take 20 mg by mouth daily.  citalopram (CELEXA) 10 mg tablet Take 1 Tab by mouth daily.  levothyroxine (SYNTHROID) 25 mcg tablet Take 1 Tab by mouth Daily (before breakfast). For Hypothyroidism (thyroid)    ezetimibe (ZETIA) 10 mg tablet Take 1 Tab by mouth daily. For cholesterol    donepezil (ARICEPT) 5 mg tablet Take 1 Tab by mouth nightly. For memory    amLODIPine (NORVASC) 5 mg tablet Take 1 Tab by mouth daily.  calcium-cholecalciferol, D3, (CALCIUM 600 + D) tablet Take 1 Tab by mouth daily.  multivitamin (ONE A DAY) tablet Take 1 Tab by mouth daily.  azelastine (ASTELIN) 137 mcg (0.1 %) nasal spray 1 [place] by Both Nostrils route two (2) times a day. Use in each nostril as directed    ferrous sulfate (SLOW FE) 142 mg (45 mg iron) ER tablet Take 1 Tab by mouth Daily (before breakfast).  montelukast (SINGULAIR) 10 mg tablet Take 1 Tab by mouth daily.  fluticasone propionate (FLONASE) 50 mcg/actuation nasal spray 2 Sprays by Both Nostrils route daily.  ALPRAZolam (XANAX) 0.25 mg tablet Take 0.5-1 Tabs by mouth daily as needed. (Patient taking differently: Take 0.125-0.25 mg by mouth nightly as needed.)    EPOETIN KAYLYN (PROCRIT IJ) by Injection route as needed. No current facility-administered medications for this visit. Allergies   Allergen Reactions    Aspirin Other (comments)     Upset stomach    Crestor [Rosuvastatin] Unknown (comments)    Namenda [Memantine] Unknown (comments)    Zocor [Simvastatin] Other (comments)     Cramps, weakness     Social History     Occupational History    Occupation: retired   Tobacco Use    Smoking status: Never Smoker    Smokeless tobacco: Never Used   Substance and Sexual Activity    Alcohol use:  Yes     Alcohol/week: 0.8 standard drinks     Types: 1 Glasses of wine per week     Comment: occassional    Drug use: No    Sexual activity: Not on file     Family History   Problem Relation Age of Onset    Cancer Mother         colon    Anemia Maternal Aunt     Osteoporosis Maternal Grandmother         DIAGNOSTIC LAB DATA      No results found for: HBA1C, HGBE8, ISG7EVRR, ESD6SNNN //   Lab Results   Component Value Date/Time    Glucose 98 01/24/2020 02:36 AM        No results found for: OPW6PXPO, PRE0ZIRM      Lab Results   Component Value Date/Time    Vitamin D 25-Hydroxy 34.6 05/11/2011 12:00 AM    VITAMIN D, 25-HYDROXY 31.5 02/25/2014 02:15 PM REVIEW OF SYSTEMS : 3/30/2021  ALL BELOW ARE Negative except : SEE HPI     All other systems reviewed and are negative. 12 point review of systems otherwise negative unless noted in HPI. DIAGNOSTIC IMAGING      XR Results (most recent):  Results from Hospital Encounter encounter on 03/24/21   XR ANKLE RT MIN 3 V    Narrative EXAM:  XR ANKLE RT MIN 3 V, XR FOOT RT MIN 3 V    INDICATION:   right ankle pain, status post fall 2 weeks ago    COMPARISON:  DEXA scan 3/14/2013. FINDINGS: 3 views of the right foot and 3 views of the right ankle    The bones are osteopenic. Transverse minimally displaced fracture at the base of  the fifth metatarsal extending to the tarsometatarsal joint with 2 mm  distraction. There is some softening and cortication and the fracture margins  suggesting subacute fracture. Questionable nondisplaced avulsion fracture at the  distal lateral aspect of the calcaneus near the calcaneocuboid articulation. Transverse nondisplaced fracture of the superior aspect of the posterior  calcaneal tuberosity involving the Achillis insertion site. There is some  softening of the fracture margins and some osseous bridging suggesting healing. No additional fractures. Ankle mortise is intact. The talar dome is smooth. Mild degenerative changes  with osteophytes along the medial and lateral malleoli. Mild talonavicular joint  space narrowing. No erosions. Mild soft tissue swelling. Impression 1. Osteopenia. Subacute minimally displaced fracture at the base of the fifth  metatarsal with intra-articular extension. 2. Questionable nondisplaced avulsion fracture at the distal lateral calcaneus. 3. Subacute nondisplaced fracture of the superior posterior calcaneal tuberosity  involving the Achillis insertion         I have reviewed the results of the above study. The interpretation of this study is my professional opinion.                 An electronic signature was used to authenticate this note.      Disclaimer: Sections of this note are dictated using utilizing voice recognition software, which may have resulted in some phonetic based errors in grammar and contents. Even though attempts were made to correct all the mistakes, some may have been missed, and remained in the body of the document. If questions arise, please contact our department.      Lazarus Iraheta, as dictated by Vladimir Bradley MD  3/30/2021  7:31 AM

## 2021-04-12 ENCOUNTER — TELEPHONE (OUTPATIENT)
Age: 86
End: 2021-04-12

## 2021-04-12 NOTE — TELEPHONE ENCOUNTER
PANTERA----Pts daughter called & just was concerned about the pt seeing a different dr since she has alzheimers & was not sure how she would react. When she is ask a question she may not answer correctly. She did a have fall in March and it took some convincing to get her to the orthopedic dr. Dwight Figueroa is able to speak with not problem . Pt has has both covid shots .  Call Janes Culp @ [phone] if any questions, pts son should be with her

## 2021-04-13 ENCOUNTER — HOSPITAL ENCOUNTER (OUTPATIENT)
Dept: INFUSION THERAPY | Age: 86
Discharge: HOME | End: 2021-04-13
Payer: MEDICARE

## 2021-04-13 ENCOUNTER — OFFICE VISIT (OUTPATIENT)
Age: 86
End: 2021-04-13
Payer: MEDICARE

## 2021-04-13 VITALS
DIASTOLIC BLOOD PRESSURE: 89 MMHG | SYSTOLIC BLOOD PRESSURE: 155 MMHG | OXYGEN SATURATION: 94 % | HEART RATE: 74 BPM | RESPIRATION RATE: 16 BRPM | TEMPERATURE: 97.4 F

## 2021-04-13 VITALS
OXYGEN SATURATION: 94 % | HEIGHT: 63 IN | TEMPERATURE: 97.4 F | RESPIRATION RATE: 16 BRPM | SYSTOLIC BLOOD PRESSURE: 176 MMHG | HEART RATE: 74 BPM | BODY MASS INDEX: 29.06 KG/M2 | DIASTOLIC BLOOD PRESSURE: 91 MMHG | WEIGHT: 164 LBS

## 2021-04-13 DIAGNOSIS — D47.1 CHRONIC MYELOPROLIFERATIVE DISORDER (HCC): ICD-10-CM

## 2021-04-13 DIAGNOSIS — D75.81: ICD-10-CM

## 2021-04-13 DIAGNOSIS — D64.9 ANEMIA, UNSPECIFIED TYPE: ICD-10-CM

## 2021-04-13 DIAGNOSIS — D75.81: Primary | ICD-10-CM

## 2021-04-13 LAB
ALBUMIN SERPL-MCNC: 3.1 G/DL (ref 3.4–5)
ALBUMIN/GLOB SERPL: 0.8 {RATIO} (ref 0.8–1.7)
ALP SERPL-CCNC: 70 U/L (ref 45–117)
ALT SERPL-CCNC: 15 U/L (ref 13–56)
ANION GAP SERPL CALC-SCNC: 7 MMOL/L (ref 3–18)
AST SERPL-CCNC: 18 U/L (ref 10–38)
BASO+EOS+MONOS # BLD AUTO: 0.3 K/UL (ref 0–2.3)
BASO+EOS+MONOS NFR BLD AUTO: 12 % (ref 0.1–17)
BILIRUB SERPL-MCNC: 0.6 MG/DL (ref 0.2–1)
BUN SERPL-MCNC: 4 MG/DL (ref 7–18)
BUN/CREAT SERPL: 5 (ref 12–20)
CALCIUM SERPL-MCNC: 8.8 MG/DL (ref 8.5–10.1)
CHLORIDE SERPL-SCNC: 100 MMOL/L (ref 100–111)
CO2 SERPL-SCNC: 33 MMOL/L (ref 21–32)
CREAT SERPL-MCNC: 0.82 MG/DL (ref 0.6–1.3)
DIFFERENTIAL METHOD BLD: ABNORMAL
ERYTHROCYTE [DISTWIDTH] IN BLOOD BY AUTOMATED COUNT: 15 % (ref 11.5–14.5)
FERRITIN SERPL-MCNC: 487 NG/ML (ref 8–388)
FOLATE SERPL-MCNC: 7.6 NG/ML (ref 3.1–17.5)
GLOBULIN SER CALC-MCNC: 4 G/DL (ref 2–4)
GLUCOSE SERPL-MCNC: 95 MG/DL (ref 74–99)
HCT VFR BLD AUTO: 39 % (ref 36–48)
HGB BLD-MCNC: 12.3 G/DL (ref 12–16)
IRON SATN MFR SERPL: 36 % (ref 20–50)
IRON SERPL-MCNC: 81 UG/DL (ref 50–175)
LYMPHOCYTES # BLD: 0.9 K/UL (ref 1.1–5.9)
LYMPHOCYTES NFR BLD: 31 % (ref 14–44)
MCH RBC QN AUTO: 26.2 PG (ref 25–35)
MCHC RBC AUTO-ENTMCNC: 31.5 G/DL (ref 31–37)
MCV RBC AUTO: 83 FL (ref 78–102)
NEUTS SEG # BLD: 1.6 K/UL (ref 1.8–9.5)
NEUTS SEG NFR BLD: 58 % (ref 40–70)
PLATELET # BLD AUTO: 190 K/UL (ref 140–440)
POTASSIUM SERPL-SCNC: 2.8 MMOL/L (ref 3.5–5.5)
PROT SERPL-MCNC: 7.1 G/DL (ref 6.4–8.2)
RBC # BLD AUTO: 4.7 M/UL (ref 4.1–5.1)
SODIUM SERPL-SCNC: 140 MMOL/L (ref 136–145)
T4 FREE SERPL-MCNC: 1.3 NG/DL (ref 0.7–1.5)
TIBC SERPL-MCNC: 225 UG/DL (ref 250–450)
TSH SERPL DL<=0.05 MIU/L-ACNC: 3.48 UIU/ML (ref 0.36–3.74)
VIT B12 SERPL-MCNC: 438 PG/ML (ref 211–911)
WBC # BLD AUTO: 2.8 K/UL (ref 4.5–13)

## 2021-04-13 PROCEDURE — 99214 OFFICE O/P EST MOD 30 MIN: CPT | Performed by: INTERNAL MEDICINE

## 2021-04-13 PROCEDURE — G8419 CALC BMI OUT NRM PARAM NOF/U: HCPCS | Performed by: INTERNAL MEDICINE

## 2021-04-13 PROCEDURE — G8754 DIAS BP LESS 90: HCPCS | Performed by: INTERNAL MEDICINE

## 2021-04-13 PROCEDURE — G8753 SYS BP > OR = 140: HCPCS | Performed by: INTERNAL MEDICINE

## 2021-04-13 PROCEDURE — 1090F PRES/ABSN URINE INCON ASSESS: CPT | Performed by: INTERNAL MEDICINE

## 2021-04-13 PROCEDURE — 85025 COMPLETE CBC W/AUTO DIFF WBC: CPT

## 2021-04-13 PROCEDURE — G8427 DOCREV CUR MEDS BY ELIG CLIN: HCPCS | Performed by: INTERNAL MEDICINE

## 2021-04-13 PROCEDURE — G8536 NO DOC ELDER MAL SCRN: HCPCS | Performed by: INTERNAL MEDICINE

## 2021-04-13 PROCEDURE — G0463 HOSPITAL OUTPT CLINIC VISIT: HCPCS | Performed by: INTERNAL MEDICINE

## 2021-04-13 PROCEDURE — 82607 VITAMIN B-12: CPT

## 2021-04-13 PROCEDURE — 36415 COLL VENOUS BLD VENIPUNCTURE: CPT

## 2021-04-13 PROCEDURE — 83540 ASSAY OF IRON: CPT

## 2021-04-13 PROCEDURE — G8432 DEP SCR NOT DOC, RNG: HCPCS | Performed by: INTERNAL MEDICINE

## 2021-04-13 PROCEDURE — 1101F PT FALLS ASSESS-DOCD LE1/YR: CPT | Performed by: INTERNAL MEDICINE

## 2021-04-13 PROCEDURE — G8399 PT W/DXA RESULTS DOCUMENT: HCPCS | Performed by: INTERNAL MEDICINE

## 2021-04-13 PROCEDURE — 84439 ASSAY OF FREE THYROXINE: CPT

## 2021-04-13 PROCEDURE — 80053 COMPREHEN METABOLIC PANEL: CPT

## 2021-04-13 PROCEDURE — 82728 ASSAY OF FERRITIN: CPT

## 2021-04-13 NOTE — PROGRESS NOTES
Hematology/Oncology  Progress Note     Name: Zully Baer  Date:  2021  : 1935     PCP: Yoanna Terrazas MD      Ms. Donal Benjamin is a 81year old female who was seen for management of her myelofibrosis.       Subjective:      Mrs. Donal Benjamin is an 44-year-old [facility] American woman with past medical history of Alzheimer's dementia who has a long-standing history of myelofibrosis and myeloproliferative neoplasm was being followed by Dr. Denise Minor retired she is here today to establish care with me. She had multiple Procrit shots before. On review of systems today she denies any fevers, chills, shortness of breath, nausea vomiting or abdominal pain. No focal neurologic deficit. No melena or bright red blood per rectum. ECOG performance status 2. Needs some help with ADLs and IADLs. Past medical history, family history, and social history: these were reviewed and remain unchanged.         Past Medical History:   Diagnosis Date    Alzheimer's disease ([facility] [address]     Anemia     Anemia NEC     Cataract     Glaucoma     Heart murmur     HTN (hypertension)     Hyperlipidemia     Myelofibrosis (HCC)     OA (osteoarthritis)     lumbar spine    Osteopenia      Past Surgical History:   Procedure Laterality Date    HX HYSTERECTOMY      vaginal    CA APPENDECTOMY       Social History     Socioeconomic History    Marital status:      Spouse name: Not on file    Number of children: Not on file    Years of education: Not on file    Highest education level: Not on file   Occupational History    Occupation: retired   Tobacco Use    Smoking status: Never Smoker    Smokeless tobacco: Never Used   Substance and Sexual Activity    Alcohol use:  Yes     Alcohol/week: 0.8 standard drinks     Types: 1 Glasses of wine per week     Comment: occassional    Drug use: No     Family History   Problem Relation Age of Onset    Cancer Mother         colon    Anemia Maternal Aunt     Osteoporosis Maternal Grandmother        Current Outpatient Medications   Medication Sig Dispense Refill    azelastine (ASTELIN) 137 mcg (0.1 %) nasal spray 1 [place] by Both Nostrils route two (2) times a day. Use in each nostril as directed 3 Bottle 3    ferrous sulfate (SLOW FE) 142 mg (45 mg iron) ER tablet Take 1 Tab by mouth Daily (before breakfast). 30 Tab 2    montelukast (SINGULAIR) 10 mg tablet Take 1 Tab by mouth daily. 90 Tab 3    memantine (NAMENDA) 10 mg tablet TAKE 1 TABLET BY MOUTH TWICE A DAY  3    BYSTOLIC 5 mg tablet TAKE 1 TABLET BY MOUTH EVERY DAY FOR 30 DAYS  0    traZODone (DESYREL) 50 mg tablet Take 50 mg by mouth nightly.  fluticasone propionate (FLONASE) 50 mcg/actuation nasal spray 2 Sprays by Both Nostrils route daily. 3 Bottle 3    dronabinol (MARINOL) 5 mg capsule Take 1 Cap by mouth two (2) times a day. Max Daily Amount: 10 mg. 60 Cap 3    iron polysacch complex-b12-fa (FERREX 150 FORTE) capsule Take 1 Cap by mouth daily. 30 Cap 2    citalopram (CELEXA) 20 mg tablet Take 20 mg by mouth daily.  ALPRAZolam (XANAX) 0.25 mg tablet Take 0.5-1 Tabs by mouth daily as needed. (Patient taking differently: Take 0.125-0.25 mg by mouth nightly as needed.) 30 Tab 0    citalopram (CELEXA) 10 mg tablet Take 1 Tab by mouth daily. 30 Tab 1    levothyroxine (SYNTHROID) 25 mcg tablet Take 1 Tab by mouth Daily (before breakfast). For Hypothyroidism (thyroid) 30 Tab 6    ezetimibe (ZETIA) 10 mg tablet Take 1 Tab by mouth daily. For cholesterol 90 Tab 3    donepezil (ARICEPT) 5 mg tablet Take 1 Tab by mouth nightly. For memory 90 Tab 3    amLODIPine (NORVASC) 5 mg tablet Take 1 Tab by mouth daily. 90 Tab 3    calcium-cholecalciferol, D3, (CALCIUM 600 + D) tablet Take 1 Tab by mouth daily. 90 Tab 3    multivitamin (ONE A DAY) tablet Take 1 Tab by mouth daily.  EPOETIN KAYLYN (PROCRIT IJ) by Injection route as needed.          Allergies   Allergen Reactions    Aspirin Other (comments)     Upset stomach  Crestor [Rosuvastatin] Unknown (comments)    Namenda [Memantine] Unknown (comments)    Zocor [Simvastatin] Other (comments)     Cramps, weakness       Review of Systems    ROS       Objective: There were no vitals taken for this visit. Physical Exam:   General appearance - alert, well appearing, and in no distress  Mental status - alert, oriented to person, place, and time  EYE-RIVER, EOMI  ENT-ENT exam normal, no neck nodes or sinus tenderness  Mouth - mucous membranes moist, pharynx normal without lesions  Neck - supple, no significant adenopathy   Chest - clear to auscultation, no wheezes, rales or rhonchi, symmetric air entry   Heart - normal rate and regular rhythm   Abdomen - soft, nontender, nondistended, no masses or organomegaly  Lymph- no adenopathy palpable  Ext-no pedal edema noted  Skin-Warm and dry. Neuro -alert, oriented, normal speech, no focal findings or movement disorder noted  Breast - no masses palapated b/l    Physical Exam     Diagnostic Imaging     No results found for this or any previous visit. Results for orders placed during the hospital encounter of 03/24/21   XR ANKLE RT MIN 3 V    Narrative EXAM:  XR ANKLE RT MIN 3 V, XR FOOT RT MIN 3 V    INDICATION:   right ankle pain, status post fall 2 weeks ago    COMPARISON:  DEXA scan 3/14/2013. FINDINGS: 3 views of the right foot and 3 views of the right ankle    The bones are osteopenic. Transverse minimally displaced fracture at the base of  the fifth metatarsal extending to the tarsometatarsal joint with 2 mm  distraction. There is some softening and cortication and the fracture margins  suggesting subacute fracture. Questionable nondisplaced avulsion fracture at the  distal lateral aspect of the calcaneus near the calcaneocuboid articulation. Transverse nondisplaced fracture of the superior aspect of the posterior  calcaneal tuberosity involving the Achillis insertion site.  There is some  softening of the fracture margins and some osseous bridging suggesting healing. No additional fractures. Ankle mortise is intact. The talar dome is smooth. Mild degenerative changes  with osteophytes along the medial and lateral malleoli. Mild talonavicular joint  space narrowing. No erosions. Mild soft tissue swelling. Impression 1. Osteopenia. Subacute minimally displaced fracture at the base of the fifth  metatarsal with intra-articular extension. 2. Questionable nondisplaced avulsion fracture at the distal lateral calcaneus. 3. Subacute nondisplaced fracture of the superior posterior calcaneal tuberosity  involving the Achillis insertion       Results for orders placed during the hospital encounter of 12/18/19   CT CHEST WO CONT    Narrative CT chest without contrast    HISTORY: Persistent dry cough for several weeks    COMPARISON: 6/12/2018    TECHNIQUE: Axial imaging through the chest performed without intravenous  contrast. Coronal and sagittal reformation. All CT scans are performed using  dose optimization techniques as appropriate to the performed exam including the  following: Automated exposure control, adjustment of mA and/or kV according to  patient size, and use of iterative reconstructive technique. FINDINGS: Calcified granuloma anterior lateral left lower lobe. No other lung  nodule or mass. No acute consolidation. No bronchial wall thickening or  bronchiectasis. No endobronchial filling defects identified. Visualized thyroid gland unremarkable. Atherosclerotic calcification of the  aorta without gross aneurysmal dilatation. There are coronary artery  calcifications. Trace pericardial fluid. Small hiatal hernia. Calcified lymph nodes in the left inferior hilum and subcarinal space. No  effusion or pneumothorax. Low-attenuation lesion at the dome of the liver measuring 1.3 cm without  significant change. Probable vascular calcification at the splenic hilum    Degenerative change in the spine. Impression IMPRESSION:    No CT finding for an acute process of the chest.    Granulomatous disease of the left chest.    Atherosclerotic disease. Stable low-attenuation lesion at the liver dome previously evaluated and  characterized as a hemangioma. Lab Results  Lab Results   Component Value Date/Time    WBC 3.4 (L) 01/24/2020 02:36 PM    HGB 11.8 (L) 01/24/2020 02:36 PM    HCT 37.5 01/24/2020 02:36 PM    PLATELET 062 69/45/5872 02:36 PM    MCV 83.1 01/24/2020 02:36 PM       Lab Results   Component Value Date/Time    Sodium 136 01/24/2020 02:36 AM    Potassium 4.0 01/24/2020 02:36 AM    Chloride 95 (L) 01/24/2020 02:36 AM    CO2 24 01/24/2020 02:36 AM    Anion gap 8 06/19/2018 03:21 PM    Glucose 98 01/24/2020 02:36 AM    BUN 10 01/24/2020 02:36 AM    Creatinine 0.96 01/24/2020 02:36 AM    BUN/Creatinine ratio 10 (L) 01/24/2020 02:36 AM    GFR est AA 63 01/24/2020 02:36 AM    GFR est non-AA 55 (L) 01/24/2020 02:36 AM    Calcium 9.2 01/24/2020 02:36 AM    Alk. phosphatase 67 01/24/2020 02:36 AM    Protein, total 7.3 01/24/2020 02:36 AM    Albumin 3.9 01/24/2020 02:36 AM    Globulin 3.9 06/19/2018 03:21 PM    A-G Ratio 1.1 (L) 01/24/2020 02:36 AM    ALT (SGPT) 13 01/24/2020 02:36 AM   Follow-up with PCP for health maintenance  Assessment/Plan:    ICD-10-CM ICD-9-CM    1. 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 Chronic myeloproliferative disorder ([facility] [address]  D47.1 238.79    3. Anemia, unspecified type  D64.9 285.9      No orders of the defined types were placed in this encounter. Chronic myeloproliferative disorder/Anemia and myelofibrosis: The patient has slowly progressive myelofibrosis and associated anemia as per previous hematologist Dr. Rosaline Samuel who retired. Her most recent labs were done in January 2020.   I will order CBC with differential, CMP, B12, folate, ferritin and iron profile and see patient back in 2 weeks virtual.    RTC 1 weeks virtual    have labs drawn prior to [address], call if any problems  There are no Patient Instructions on file for this visit.        Danyell Cheng MD

## 2021-04-13 NOTE — PROGRESS NOTES
[facility] Progress Note    Date: 2021    Name: Td Ho    MRN: [id number]         : 1935    Peripheral Lab Draw      Ms. Daugherty to [facility], ambulatory at 1410 accompanied by self. Pt was assessed and education was provided. Ms. Dorcas Alarcon vitals were reviewed and patient was observed for 5 minutes prior to treatment. Visit Vitals  BP (!) 155/89   Pulse 74   Temp 97.4 °F (36.3 °C) (Oral)   Resp 16   SpO2 94%     Recent Results (from the past 12 hour(s))   CBC WITH 3 PART DIFF    Collection Time: 21  2:20 PM   Result Value Ref Range    WBC 2.8 (L) 4.5 - 13.0 K/uL    RBC 4.70 4. 10 - 5.10 M/uL    HGB 12.3 12.0 - 16.0 g/dL    HCT 39.0 36 - 48 %    MCV 83.0 78 - 102 FL    MCH 26.2 25.0 - 35.0 PG    MCHC 31.5 31 - 37 g/dL    RDW 15.0 (H) 11.5 - 14.5 %    PLATELET 935 712 - 465 K/uL    NEUTROPHILS 58 40 - 70 %    MIXED CELLS 12 0.1 - 17 %    LYMPHOCYTES 31 14 - 44 %    ABS. NEUTROPHILS 1.6 (L) 1.8 - 9.5 K/UL    ABS. MIXED CELLS 0.3 0.0 - 2.3 K/uL    ABS. LYMPHOCYTES 0.9 (L) 1.1 - 5.9 K/UL    DF AUTOMATED         Blood obtained peripherally from left arm x 1 attempt with butterfly needle and sent to lab for Cbc w/diff, Cmp, Iron profile, Ferritin, Free T4, Tsh, Vitamin B12 & Folate per written orders. No bleeding or hematoma noted at site. Gauze and coban applied. Ms. Neha Ernst tolerated the phlebotomy, and had no complaints. Patient armband removed and shredded. Ms. Neha Ernst was discharged from [place] [address] in stable condition at [id number] [phone].      Hannah Lobato Phlebotomist PCT  2021  2:44 PM

## 2021-04-14 ENCOUNTER — DOCUMENTATION ONLY (OUTPATIENT)
Age: 86
End: 2021-04-14

## 2021-04-14 RX ORDER — POTASSIUM CHLORIDE 20 MEQ/1
TABLET, EXTENDED RELEASE ORAL
Qty: 2 TAB | Refills: 0 | Status: SHIPPED | OUTPATIENT
Start: 2021-04-14

## 2021-04-20 ENCOUNTER — OFFICE VISIT (OUTPATIENT)
Dept: ORTHOPEDIC SURGERY | Age: 86
End: 2021-04-20
Payer: MEDICARE

## 2021-04-20 VITALS
WEIGHT: 164 LBS | HEIGHT: 63 IN | RESPIRATION RATE: 16 BRPM | HEART RATE: 60 BPM | OXYGEN SATURATION: 97 % | BODY MASS INDEX: 29.06 KG/M2 | TEMPERATURE: 97.6 F

## 2021-04-20 DIAGNOSIS — S92.351D CLOSED DISPLACED FRACTURE OF FIFTH METATARSAL BONE OF RIGHT FOOT WITH ROUTINE HEALING, SUBSEQUENT ENCOUNTER: ICD-10-CM

## 2021-04-20 DIAGNOSIS — M79.671 RIGHT FOOT PAIN: Primary | ICD-10-CM

## 2021-04-20 DIAGNOSIS — S92.024D CLOSED NONDISPLACED FRACTURE OF ANTERIOR PROCESS OF RIGHT CALCANEUS WITH ROUTINE HEALING, SUBSEQUENT ENCOUNTER: ICD-10-CM

## 2021-04-20 PROCEDURE — 73630 X-RAY EXAM OF FOOT: CPT | Performed by: ORTHOPAEDIC SURGERY

## 2021-04-20 PROCEDURE — 99024 POSTOP FOLLOW-UP VISIT: CPT | Performed by: ORTHOPAEDIC SURGERY

## 2021-04-20 NOTE — PROGRESS NOTES
AMBULATORY PROGRESS NOTE      Patient: Francisco Salvador             MRN: [id number]     SSN: [id number] Body mass index is 29.05 kg/m². YOB: 1935     AGE: 81 y.o. EX: female    PCP: Jg Quinonez MD       IMPRESSION //  DIAGNOSIS AND TREATMENT PLAN        Alicia Daugherty has here for right fifth metatarsal base fracture, and she also had a history of a avulsion type fracture calcaneus patient was here with her . She about 4 weeks out from this injury. She is doing better much stimulant like longer function bit better. Planes of the left below    DIAGNOSES  1. Right foot pain    2. Closed nondisplaced fracture of anterior process of right calcaneus with routine healing, subsequent encounter    3. Closed displaced fracture of fifth metatarsal bone of right foot with routine healing, subsequent encounter        Orders Placed This Encounter    [[id number]] Foot Min 3V     Order Specific Question:   Weight bearing? Answer:   No        PLAN:    1. I will obtain a 3-view XR of R foot in the office today. 2. I will provide a DME order: R hard soled shoe for ambulation inside the house . 3. I will advise pt to wear the R short CAM walker boot outside of the house. 4. I anticipate ordering a XR of R foot at next OV.       RTO-  1 month    Alicia Daugherty  expresses understanding of the diagnosis, treatment plan, and all of their proposed questions were answered to their satisfaction. Patient education has been provided re the diagnoses. Alicia Daugherty may have a reminder for a \"due or due soon\" health maintenance. I have asked that she contact her primary care provider for follow-up on this health maintenance.         HPI //  Omar Geeid IS A 81 y.o. female who is a/an  established patient, presenting to my outpatient office for evaluation of  the following chief complaint(s):     Chief Complaint   Patient presents with   [address] [place] [address] Pain     right       At [address] [facility] [place] 2[place] Pt's daughter fell when she was in their den approimately 2.5 weeks ago. She went to the ED on 3/24/2021 and had XRs done on her right ankle and has been ambulating with a rolling walker ever since. I provided her a short CAM walker boot and advised her to use OTC Tylenol to manage her pain. Since LOV pt states her R foot pain has improved. Pt is compliant with her R short CAM walker boot, even though she has not been doing much ambulation. She communicates she has been cautious. Pt's son communicates she uses a shower chair when taking a shower. Visit Vitals  Pulse 60   Temp 97.6 °F (36.4 °C)   Resp 16   Ht 5' 3\" (1.6 m)   Wt 164 lb (74.4 kg)   SpO2 97%   BMI 29.05 kg/m²       Appearance: Alert, well appearing and pleasant patient who is in no distress, oriented to person, place/time, and who follows commands. This patient is accompanied in the examination room by her  son. There is signs of: no dementia  Psychiatric: Affect/mood are appropriate. Speech normal in context and clarity, memory intact grossly, no involuntary movements - tremors. Patient arrives to office via: with assistive device: R short CAM walker boot  H EENT (2): Head normocephalic & atraumatic. Both pupils are round     Eye: EOM are intact // Neck: ROM WNL  //  Hearings Intact   Respiratory: Breathing non labored     ANKLE/FOOT right    Gait: uses assistive device   Tenderness: mild tenderness over the 5th metartasal  Cutaneous: less swelling, wrinkles  Joint Motion:   WNL  Joint / Tendon Stability: No Ankle or Subtalar instability or joint laxity. No peroneal sublux ability or dislocation  Alignment: neutral Hindfoot,    Neuro Motor/Sensory: NL/NL  Vascular: NL foot/ankle pulses,   Lymphatics: No extremity lymphedema, No calf swelling, no tenderness to calf muscles.           CHART REVIEW     Alicia Daugherty has been experiencing pain and discomfort confirmed as outlined in the pain assessment outlined below.  was reviewed by Connie Metzger MD on 4/20/2021. Pain Assessment  4/20/2021   Location of Pain Foot   Location Modifiers Right   Severity of Pain 0   Quality of Pain -   Duration of Pain -   Frequency of Pain -   Date Pain First Started -   Aggravating Factors -   Limiting Behavior -   Relieving Factors -   Result of Injury -   Work-Related Injury -   Type of Injury -        Alicia Daugherty  has a past medical history of Alzheimer's disease ([facility] [address] Anemia, Anemia NEC, Cataract, Glaucoma, Heart murmur, HTN (hypertension), Hyperlipidemia, Myelofibrosis ([address] OA (osteoarthritis), and Osteopenia. Patients is employed at:         Past Medical History:   Diagnosis Date    Alzheimer's disease ([address]     Anemia     Anemia NEC     Cataract     Glaucoma     Heart murmur     HTN (hypertension)     Hyperlipidemia     Myelofibrosis (HCC)     OA (osteoarthritis)     lumbar spine    Osteopenia      Past Surgical History:   Procedure Laterality Date    HX HYSTERECTOMY      vaginal    NC APPENDECTOMY       Current Outpatient Medications   Medication Sig    potassium chloride (K-DUR, KLOR-CON) 20 mEq tablet Take 40MEQ by mouth now.  azelastine (ASTELIN) 137 mcg (0.1 %) nasal spray 1 [place] by Both Nostrils route two (2) times a day. Use in each nostril as directed    ferrous sulfate (SLOW FE) 142 mg (45 mg iron) ER tablet Take 1 Tab by mouth Daily (before breakfast).  montelukast (SINGULAIR) 10 mg tablet Take 1 Tab by mouth daily.  memantine (NAMENDA) 10 mg tablet TAKE 1 TABLET BY MOUTH TWICE A DAY    BYSTOLIC 5 mg tablet TAKE 1 TABLET BY MOUTH EVERY DAY FOR 30 DAYS    traZODone (DESYREL) 50 mg tablet Take 50 mg by mouth nightly.  fluticasone propionate (FLONASE) 50 mcg/actuation nasal spray 2 Sprays by Both Nostrils route daily.  dronabinol (MARINOL) 5 mg capsule Take 1 Cap by mouth two (2) times a day.  Max Daily Amount: 10 mg.    iron polysacch complex-b12-fa (FERREX 150 FORTE) capsule Take 1 Cap by mouth daily.  citalopram (CELEXA) 20 mg tablet Take 20 mg by mouth daily.  ALPRAZolam (XANAX) 0.25 mg tablet Take 0.5-1 Tabs by mouth daily as needed. (Patient taking differently: Take 0.125-0.25 mg by mouth nightly as needed.)    citalopram (CELEXA) 10 mg tablet Take 1 Tab by mouth daily.  levothyroxine (SYNTHROID) 25 mcg tablet Take 1 Tab by mouth Daily (before breakfast). For Hypothyroidism (thyroid)    ezetimibe (ZETIA) 10 mg tablet Take 1 Tab by mouth daily. For cholesterol    donepezil (ARICEPT) 5 mg tablet Take 1 Tab by mouth nightly. For memory    amLODIPine (NORVASC) 5 mg tablet Take 1 Tab by mouth daily.  calcium-cholecalciferol, D3, (CALCIUM 600 + D) tablet Take 1 Tab by mouth daily.  multivitamin (ONE A DAY) tablet Take 1 Tab by mouth daily.  EPOETIN KAYLYN (PROCRIT IJ) by Injection route as needed. No current facility-administered medications for this visit. Allergies   Allergen Reactions    Aspirin Other (comments)     Upset stomach    Crestor [Rosuvastatin] Unknown (comments)    Namenda [Memantine] Unknown (comments)    Zocor [Simvastatin] Other (comments)     Cramps, weakness     Social History     Occupational History    Occupation: retired   Tobacco Use    Smoking status: Never Smoker    Smokeless tobacco: Never Used   Substance and Sexual Activity    Alcohol use:  Yes     Alcohol/week: 0.8 standard drinks     Types: 1 Glasses of wine per week     Comment: occassional    Drug use: No    Sexual activity: Not on file     Family History   Problem Relation Age of Onset    Cancer Mother         colon    Anemia Maternal Aunt     Osteoporosis Maternal Grandmother         DIAGNOSTIC LAB DATA      No results found for: HBA1C, HGBE8, MCJ2MCLC, XQN0VDFZ //   Lab Results   Component Value Date/Time    Glucose 95 04/13/2021 02:20 PM        No results found for: OEI3WRYY, YUU7NSEB      Lab Results   Component Value Date/Time Vitamin D 25-Hydroxy 34.6 05/11/2011 12:00 AM    VITAMIN D, 25-HYDROXY 31.5 02/25/2014 02:15 PM         REVIEW OF SYSTEMS : 4/20/2021  ALL BELOW ARE Negative except : SEE HPI     All other systems reviewed and are negative. 12 point review of systems otherwise negative unless noted in HPI. DIAGNOSTIC IMAGING /ORDERS     AP view, right foot, there is a healing right fifth metatarsal base fracture, nondisplaced, nonangulated. I have reviewed the results of the above study. The interpretation of this study is my professional opinion. An electronic signature was used to authenticate this note. Disclaimer: Sections of this note are dictated using utilizing voice recognition software, which may have resulted in some phonetic based errors in grammar and contents. Even though attempts were made to correct all the mistakes, some may have been missed, and remained in the body of the document. If questions arise, please contact our department.      Cynthia Sidhu, as dictated by Leda Forde MD  4/20/2021  1:32 PM Initial (On Arrival)

## 2021-04-27 ENCOUNTER — VIRTUAL VISIT (OUTPATIENT)
Age: 86
End: 2021-04-27
Payer: MEDICARE

## 2021-04-27 DIAGNOSIS — D75.81: Primary | ICD-10-CM

## 2021-04-27 DIAGNOSIS — D64.9 ANEMIA, UNSPECIFIED TYPE: ICD-10-CM

## 2021-04-27 PROCEDURE — 99442 PR PHYS/QHP TELEPHONE EVALUATION 11-20 MIN: CPT | Performed by: INTERNAL MEDICINE

## 2021-04-27 NOTE — PROGRESS NOTES
Osman Zavala is a 81 y.o. female, evaluated via audio-only technology on 4/27/2021     PCP: Elva Wheeler MD    Assessment & Plan:   Chronic myeloproliferative disorder/Anemia and myelofibrosis  --The patient has slowly progressive myelofibrosis and associated anemia as per previous hematologist Dr. Porfirio Bello who retired. However all her labs are normal on 04/13/21 with mild neutropenia and lymphopenia with no repeated infections. --Labs on 04/13/2021 showed WBC 2.8K/uL, hemoglobin 12.3 with hematocrit of 39%. Platelet 084. ANC 1.6 with ALC 0.9. Ferritin 487. Iron 81 with transferrin sat of 36%. Vit B12 and Folate were normal. TSH and T4 were normal.   --Recheck labs in 6 months  --Follow up in 6 months or sooner if indicated  --  Subjective:   Mrs. Dino Villafuerte is an 81-year-old [facility] [employer] woman with past medical history of Alzheimer's dementia who has a long-standing history of myelofibrosis and myeloproliferative neoplasm was being followed by Dr. Porfirio Bello who retired. Per her previous note she had multiple Procrit shots. On review of systems she denies any fevers, recurrent infections, chills, shortness of breath, nausea vomiting or abdominal pain. No focal neurologic deficit. No melena or bright red blood per rectum. Denies pain or discomfort. She does not have any concerns or complaints to report at this time. ECOG performance status 2. Needs some help with ADLs and IADLs.    Prior to Admission medications    Medication Sig Start Date End Date Taking? Authorizing Provider   potassium chloride (K-DUR, KLOR-CON) 20 mEq tablet Take 40MEQ by mouth now. 4/14/21   Adele Hutchins NP   azelastine (ASTELIN) 137 mcg (0.1 %) nasal spray 1 [place] by Both Nostrils route two (2) times a day. Use in each nostril as directed 5/18/20   María Jackson MD   ferrous sulfate (SLOW FE) 142 mg (45 mg iron) ER tablet Take 1 Tab by mouth Daily (before breakfast).  1/24/20   Adele Hutchins NP   montelukast (SINGULAIR) 10 mg tablet Take 1 Tab by mouth daily. 1/17/20   Alanis Holder MD   memantine (NAMENDA) 10 mg tablet TAKE 1 TABLET BY MOUTH TWICE A DAY 9/7/19   Provider, Historical   BYSTOLIC 5 mg tablet TAKE 1 TABLET BY MOUTH EVERY DAY FOR 30 DAYS 10/1/19   Provider, Historical   traZODone (DESYREL) 50 mg tablet Take 50 mg by mouth nightly. Provider, Historical   fluticasone propionate (FLONASE) 50 mcg/actuation nasal spray 2 Sprays by Both Nostrils route daily. 10/11/19   Alanis Hodler MD   dronabinol (MARINOL) 5 mg capsule Take 1 Cap by mouth two (2) times a day. Max Daily Amount: 10 mg. 2/10/17   Kristen Blackwell MD   iron polysacch complex-b12-fa (FERREX 150 FORTE) capsule Take 1 Cap by mouth daily. 4/22/14   Olexy, [place], NP   citalopram (CELEXA) 20 mg tablet Take 20 mg by mouth daily. Provider, Historical   ALPRAZolam (XANAX) 0.25 mg tablet Take 0.5-1 Tabs by mouth daily as needed. Patient taking differently: Take 0.125-0.25 mg by mouth nightly as needed. 9/6/13   Fara Santiago MD   citalopram (CELEXA) 10 mg tablet Take 1 Tab by mouth daily. 9/6/13   Fara Santiago MD   levothyroxine (SYNTHROID) 25 mcg tablet Take 1 Tab by mouth Daily (before breakfast). For Hypothyroidism (thyroid) 3/27/13   Zoë Brown NP   ezetimibe (ZETIA) 10 mg tablet Take 1 Tab by mouth daily. For cholesterol 3/27/13   Zoë Brown NP   donepezil (ARICEPT) 5 mg tablet Take 1 Tab by mouth nightly. For memory 3/27/13   Zoë Brown NP   amLODIPine (NORVASC) 5 mg tablet Take 1 Tab by mouth daily. 3/27/13   Zoë Brown, NP   calcium-cholecalciferol, D3, (CALCIUM 600 + D) tablet Take 1 Tab by mouth daily. 3/27/13   Zoë Brown NP   multivitamin (ONE A DAY) tablet Take 1 Tab by mouth daily. Provider, Historical   EPOETIN KAYLYN (PROCRIT IJ) by Injection route as needed. 6/9/10   Provider, Historical         Review of Systems   All other systems reviewed and are negative.     Labs      Lab Results   Component Value Date/Time WBC 2.8 (L) 04/13/2021 02:20 PM    HGB (POC) 11.0 (A) 04/22/2014 12:34 PM    HGB 12.3 04/13/2021 02:20 PM    HCT (POC) 35.7 (A) 04/22/2014 12:34 PM    HCT 39.0 04/13/2021 02:20 PM    PLATELET 335 32/71/8193 02:20 PM    MCV 83.0 04/13/2021 02:20 PM     Lab Results   Component Value Date/Time    Sodium 140 04/13/2021 02:20 PM    Potassium 2.8 (LL) 04/13/2021 02:20 PM    Chloride 100 04/13/2021 02:20 PM    CO2 33 (H) 04/13/2021 02:20 PM    Anion gap 7 04/13/2021 02:20 PM    Glucose 95 04/13/2021 02:20 PM    BUN 4 (L) 04/13/2021 02:20 PM    Creatinine 0.82 04/13/2021 02:20 PM    BUN/Creatinine ratio 5 (L) 04/13/2021 02:20 PM    GFR est AA >60 04/13/2021 02:20 PM    GFR est non-AA >60 04/13/2021 02:20 PM    Calcium 8.8 04/13/2021 02:20 PM    Bilirubin, total 0.6 04/13/2021 02:20 PM    Alk. phosphatase 70 04/13/2021 02:20 PM    Protein, total 7.1 04/13/2021 02:20 PM    Albumin 3.1 (L) 04/13/2021 02:20 PM    Globulin 4.0 04/13/2021 02:20 PM    A-G Ratio 0.8 04/13/2021 02:20 PM    ALT (SGPT) 15 04/13/2021 02:20 PM    AST (SGOT) 18 04/13/2021 02:20 PM     Lab Results   Component Value Date/Time    Iron 81 04/13/2021 02:20 PM    TIBC 225 (L) 04/13/2021 02:20 PM    Iron % saturation 36 04/13/2021 02:20 PM    Ferritin 487 (H) 04/13/2021 02:20 PM     Lab Results   Component Value Date/Time    Vitamin B12 438 04/13/2021 02:20 PM    Folate 7.6 04/13/2021 02:20 PM     Lab Results   Component Value Date/Time    TSH 3.48 04/13/2021 02:20 PM         Alicia Daugherty, who was evaluated through a patient-initiated, synchronous (real-time) audio only encounter, and/or her healthcare decision maker, is aware that it is a billable service, with coverage as determined by her insurance carrier. She provided verbal consent to proceed: Yes. She has not had a related appointment within my department in the past 7 days or scheduled within the next 24 hours.       Total Time: minutes: 11-20 minutes  Follow up in 6 months or sooner if indicated    Orders Placed This Encounter    CBC WITH AUTOMATED DIFF     Standing Status:   Future     Standing Expiration Date:   4/28/2022    IRON PROFILE     Standing Status:   Future     Standing Expiration Date:   6/25/3407    METABOLIC PANEL, COMPREHENSIVE     Standing Status:   Future     Standing Expiration Date:   4/28/2022    FERRITIN     Standing Status:   Future     Standing Expiration Date:   4/28/2022         Cassie Mora MD  04/27/21

## 2021-06-10 ENCOUNTER — OFFICE VISIT (OUTPATIENT)
Dept: ORTHOPEDIC SURGERY | Age: 86
End: 2021-06-10
Payer: MEDICARE

## 2021-06-10 VITALS
HEART RATE: 79 BPM | OXYGEN SATURATION: 97 % | BODY MASS INDEX: 27.29 KG/M2 | TEMPERATURE: 96.7 F | WEIGHT: 154 LBS | HEIGHT: 63 IN

## 2021-06-10 DIAGNOSIS — S92.024D CLOSED NONDISPLACED FRACTURE OF ANTERIOR PROCESS OF RIGHT CALCANEUS WITH ROUTINE HEALING, SUBSEQUENT ENCOUNTER: ICD-10-CM

## 2021-06-10 DIAGNOSIS — S92.351D CLOSED DISPLACED FRACTURE OF FIFTH METATARSAL BONE OF RIGHT FOOT WITH ROUTINE HEALING, SUBSEQUENT ENCOUNTER: Primary | ICD-10-CM

## 2021-06-10 PROCEDURE — G8756 NO BP MEASURE DOC: HCPCS | Performed by: ORTHOPAEDIC SURGERY

## 2021-06-10 PROCEDURE — 1090F PRES/ABSN URINE INCON ASSESS: CPT | Performed by: ORTHOPAEDIC SURGERY

## 2021-06-10 PROCEDURE — G8419 CALC BMI OUT NRM PARAM NOF/U: HCPCS | Performed by: ORTHOPAEDIC SURGERY

## 2021-06-10 PROCEDURE — G8510 SCR DEP NEG, NO PLAN REQD: HCPCS | Performed by: ORTHOPAEDIC SURGERY

## 2021-06-10 PROCEDURE — 1101F PT FALLS ASSESS-DOCD LE1/YR: CPT | Performed by: ORTHOPAEDIC SURGERY

## 2021-06-10 PROCEDURE — G8536 NO DOC ELDER MAL SCRN: HCPCS | Performed by: ORTHOPAEDIC SURGERY

## 2021-06-10 PROCEDURE — G8399 PT W/DXA RESULTS DOCUMENT: HCPCS | Performed by: ORTHOPAEDIC SURGERY

## 2021-06-10 PROCEDURE — G8427 DOCREV CUR MEDS BY ELIG CLIN: HCPCS | Performed by: ORTHOPAEDIC SURGERY

## 2021-06-10 PROCEDURE — 99024 POSTOP FOLLOW-UP VISIT: CPT | Performed by: ORTHOPAEDIC SURGERY

## 2021-06-10 PROCEDURE — 73630 X-RAY EXAM OF FOOT: CPT | Performed by: ORTHOPAEDIC SURGERY

## 2021-06-10 NOTE — PROGRESS NOTES
AMBULATORY PROGRESS NOTE      Patient: Liborio Sanchez             MRN: [id number]     SSN: [id number] Body mass index is 27.28 kg/m². YOB: 1935     AGE: 81 y.o. EX: female    PCP: Becki Fernandez MD       IMPRESSION //  DIAGNOSIS AND TREATMENT PLAN        Alicia Daugherty has a diagnosis of:      DIAGNOSES    1. Closed displaced fracture of fifth metatarsal bone of right foot with routine healing, subsequent encounter    2. Closed nondisplaced fracture of anterior process of right calcaneus with routine healing, subsequent encounter        Orders Placed This Encounter    AMB POC XRAY, FOOT; COMPLETE, 3+ VIEW     ASK ALL FEMALE PATIENTS IF PREGNANT     Order Specific Question:   Reason for Exam     Answer:   PAIN        PLAN:    1. We will see her as needed, as she is doing quite well. RTO-PRN    Alicia Daugherty  expresses understanding of the diagnosis, treatment plan, and all of their proposed questions were answered to their satisfaction. Patient education has been provided re the diagnoses. HPI //  Julia Blankenship IS A 81 y.o. female who is a/an  established patient, presenting to my outpatient office for evaluation of  the following chief complaint(s):     Chief Complaint   Patient presents with    Foot Pain     right foot       She is here with her daughter. She is here for follow-up, having sustained a right fifth metatarsal base fracture, as well as a calcaneal tuberosity fracture nondisplaced. This injury occurred in March 2021, so she is at least in between 10 and 14 weeks out from this injury. She was having no pain, and currently, in her right foot and ankle. She mentions having start up stiffness in her knees, and ankles at times, but otherwise not having any pain in her right foot as relates her fifth metatarsal base or her calcaneal tuberosity.     Visit Vitals  Pulse 79   Temp (!) 96.7 °F (35.9 °C) (Temporal) Ht 5' 3\" (1.6 m)   Wt 154 lb (69.9 kg)   SpO2 97%   BMI 27.28 kg/m²       Appearance: Alert, well appearing and pleasant patient who is in no distress, oriented to person, place/time, and who follows commands. This patient is accompanied in the examination room by her  daughter. Psychiatric: Affect/mood are appropriate. Speech normal in context and clarity, memory intact grossly, no involuntary movements - tremors. Patient arrives to office via: without assistive device:    H EENT (2): Head normocephalic & atraumatic. Eye: pupils are round// EOM are intact // Neck: ROM WNL  // Hearings Intact   Respiratory: Breathing non labored     ANKLE/FOOT right    Gait: slow  Tenderness: no to the right foot and ankle  Cutaneous:   WNL. Joint Motion:   WNL  Joint / Tendon Stability: No Ankle or Subtalar instability or joint laxity. No peroneal sublux ability or dislocation  Alignment: neutral Hindfoot,    Neuro Motor/Sensory: NL/NL  Vascular: NL foot/ankle pulses,   Lymphatics: No extremity lymphedema, No calf swelling, no tenderness to calf muscles. CHART REVIEW     Alicia Daugherty has been experiencing pain and discomfort confirmed as outlined in the pain assessment outlined below.  was reviewed by Veda Mann MD on 6/10/2021. Pain Assessment  6/10/2021   Location of Pain Foot   Location Modifiers Right   Severity of Pain 0   Quality of Pain -   Duration of Pain -   Frequency of Pain -   Date Pain First Started -   Aggravating Factors -   Limiting Behavior -   Relieving Factors -   Result of Injury -   Work-Related Injury -   Type of Injury -        Alicia Daugherty  has a past medical history of Alzheimer's disease ([address] Anemia, Anemia NEC, Cataract, Glaucoma, Heart murmur, HTN (hypertension), Hyperlipidemia, Myelofibrosis ([address] OA (osteoarthritis), and Osteopenia.      Patients is employed at:         Past Medical History:   Diagnosis Date    Alzheimer's disease ([address]     Anemia     Anemia NEC     Cataract     Glaucoma     Heart murmur     HTN (hypertension)     Hyperlipidemia     Myelofibrosis (HCC)     OA (osteoarthritis)     lumbar spine    Osteopenia      Past Surgical History:   Procedure Laterality Date    HX HYSTERECTOMY      vaginal    NM APPENDECTOMY       Current Outpatient Medications   Medication Sig    potassium chloride (K-DUR, KLOR-CON) 20 mEq tablet Take 40MEQ by mouth now.  ferrous sulfate (SLOW FE) 142 mg (45 mg iron) ER tablet Take 1 Tab by mouth Daily (before breakfast).  memantine (NAMENDA) 10 mg tablet TAKE 1 TABLET BY MOUTH TWICE A DAY    traZODone (DESYREL) 50 mg tablet Take 50 mg by mouth nightly.  iron polysacch complex-b12-fa (FERREX 150 FORTE) capsule Take 1 Cap by mouth daily.  levothyroxine (SYNTHROID) 25 mcg tablet Take 1 Tab by mouth Daily (before breakfast). For Hypothyroidism (thyroid)    ezetimibe (ZETIA) 10 mg tablet Take 1 Tab by mouth daily. For cholesterol    donepezil (ARICEPT) 5 mg tablet Take 1 Tab by mouth nightly. For memory    amLODIPine (NORVASC) 5 mg tablet Take 1 Tab by mouth daily.  EPOETIN KAYLYN (PROCRIT IJ) by Injection route as needed.  azelastine (ASTELIN) 137 mcg (0.1 %) nasal spray 1 [place] by Both Nostrils route two (2) times a day. Use in each nostril as directed (Patient not taking: Reported on 6/10/2021)    montelukast (SINGULAIR) 10 mg tablet Take 1 Tab by mouth daily. (Patient not taking: Reported on 4/84/3698)    BYSTOLIC 5 mg tablet TAKE 1 TABLET BY MOUTH EVERY DAY FOR 30 DAYS    fluticasone propionate (FLONASE) 50 mcg/actuation nasal spray 2 Sprays by Both Nostrils route daily. (Patient not taking: Reported on 6/10/2021)    dronabinol (MARINOL) 5 mg capsule Take 1 Cap by mouth two (2) times a day. Max Daily Amount: 10 mg. (Patient not taking: Reported on 6/10/2021)    citalopram (CELEXA) 20 mg tablet Take 20 mg by mouth daily.  (Patient not taking: Reported on 6/10/2021)    ALPRAZolam Rosey Espinoza) 0.25 mg tablet Take 0.5-1 Tabs by mouth daily as needed. (Patient not taking: Reported on 6/10/2021)    citalopram (CELEXA) 10 mg tablet Take 1 Tab by mouth daily. (Patient not taking: Reported on 6/10/2021)    calcium-cholecalciferol, D3, (CALCIUM 600 + D) tablet Take 1 Tab by mouth daily. (Patient not taking: Reported on 6/10/2021)    multivitamin (ONE A DAY) tablet Take 1 Tab by mouth daily. (Patient not taking: Reported on 6/10/2021)     No current facility-administered medications for this visit. Allergies   Allergen Reactions    Aspirin Other (comments)     Upset stomach    Crestor [Rosuvastatin] Unknown (comments)    Namenda [Memantine] Unknown (comments)    Zocor [Simvastatin] Other (comments)     Cramps, weakness     Social History     Occupational History    Occupation: retired   Tobacco Use    Smoking status: Never Smoker    Smokeless tobacco: Never Used   Substance and Sexual Activity    Alcohol use: Yes     Alcohol/week: 0.8 standard drinks     Types: 1 Glasses of wine per week     Comment: occassional    Drug use: No    Sexual activity: Not on file     Family History   Problem Relation Age of Onset    Cancer Mother         colon    Anemia Maternal Aunt     Osteoporosis Maternal Grandmother         DIAGNOSTIC LAB DATA      No results found for: HBA1C, YSE6XDOL, ICM4WYBS //   Lab Results   Component Value Date/Time    Glucose 95 04/13/2021 02:20 PM        No results found for: OLU8AAQO, FQU4QDQQ      Lab Results   Component Value Date/Time    Vitamin D 25-Hydroxy 34.6 05/11/2011 12:00 AM    VITAMIN D, 25-HYDROXY 31.5 02/25/2014 02:15 PM         REVIEW OF SYSTEMS : 6/10/2021  ALL BELOW ARE Negative except : SEE HPI     All other systems reviewed and are negative. 12 point review of systems otherwise negative unless noted in HPI.       DIAGNOSTIC IMAGING /ORDERS     Orders Placed This Encounter    AMB POC XRAY, FOOT; COMPLETE, 3+ VIEW     ASK ALL FEMALE PATIENTS IF PREGNANT Order Specific Question:   Reason for Exam     Answer:   PAIN       X-rays 3 views right foot nonweightbearing: AP, lateral, oblique: I can barely see the fracture to the right fifth metatarsal base. Otherwise I see no displaced acute fractures to the right forefoot midfoot or hindfoot. There are some generalized associated osteopenia, on these x-rays. I have reviewed the results of the above study. The interpretation of this study is my professional opinion. An electronic signature was used to authenticate this note. Mine Hinton MD  6/10/2021  8:09 AM      Disclaimer: Sections of this note are dictated using utilizing voice recognition software, which may have resulted in some phonetic based errors in grammar and contents. Even though attempts were made to correct all the mistakes, some may have been missed, and remained in the body of the document. If questions arise, please contact our department. Alicia Daugherty may have a reminder for a \"due or due soon\" health maintenance. I have asked that she contact her primary care provider for follow-up on this health maintenance.       Mine Hinotn MD  6/10/2021  8:09 AM

## 2021-10-27 ENCOUNTER — APPOINTMENT (OUTPATIENT)
Dept: INFUSION THERAPY | Age: 86
End: 2021-10-27

## 2021-11-24 ENCOUNTER — APPOINTMENT (OUTPATIENT)
Dept: INFUSION THERAPY | Age: 86
End: 2021-11-24
Attending: NURSE PRACTITIONER

## 2022-01-17 ENCOUNTER — HOSPITAL ENCOUNTER (OUTPATIENT)
Dept: INFUSION THERAPY | Age: 87
Discharge: HOME | End: 2022-01-17
Payer: MEDICARE

## 2022-01-17 DIAGNOSIS — D64.9 ANEMIA, UNSPECIFIED TYPE: ICD-10-CM

## 2022-01-17 DIAGNOSIS — D75.81: ICD-10-CM

## 2022-01-17 LAB
ALBUMIN SERPL-MCNC: 2.9 G/DL (ref 3.4–5)
ALBUMIN/GLOB SERPL: 0.7 {RATIO} (ref 0.8–1.7)
ALP SERPL-CCNC: 66 U/L (ref 45–117)
ALT SERPL-CCNC: 13 U/L (ref 13–56)
ANION GAP SERPL CALC-SCNC: 6 MMOL/L (ref 3–18)
AST SERPL-CCNC: 13 U/L (ref 10–38)
BASOPHILS # BLD: 0 K/UL (ref 0–0.1)
BASOPHILS NFR BLD: 0 % (ref 0–2)
BILIRUB SERPL-MCNC: 0.5 MG/DL (ref 0.2–1)
BUN SERPL-MCNC: 17 MG/DL (ref 7–18)
BUN/CREAT SERPL: 20 (ref 12–20)
CALCIUM SERPL-MCNC: 9.4 MG/DL (ref 8.5–10.1)
CHLORIDE SERPL-SCNC: 98 MMOL/L (ref 100–111)
CO2 SERPL-SCNC: 32 MMOL/L (ref 21–32)
CREAT SERPL-MCNC: 0.87 MG/DL (ref 0.6–1.3)
DIFFERENTIAL METHOD BLD: ABNORMAL
EOSINOPHIL # BLD: 0 K/UL (ref 0–0.4)
EOSINOPHIL NFR BLD: 1 % (ref 0–5)
ERYTHROCYTE [DISTWIDTH] IN BLOOD BY AUTOMATED COUNT: 14.5 % (ref 11.6–14.5)
FERRITIN SERPL-MCNC: 644 NG/ML (ref 8–388)
GLOBULIN SER CALC-MCNC: 4.3 G/DL (ref 2–4)
GLUCOSE SERPL-MCNC: 119 MG/DL (ref 74–99)
HCT VFR BLD AUTO: 35.4 % (ref 35–45)
HGB BLD-MCNC: 11.3 G/DL (ref 12–16)
IMM GRANULOCYTES # BLD AUTO: 0 K/UL (ref 0–0.04)
IMM GRANULOCYTES NFR BLD AUTO: 1 % (ref 0–0.5)
IRON SATN MFR SERPL: 27 % (ref 20–50)
IRON SERPL-MCNC: 59 UG/DL (ref 50–175)
LYMPHOCYTES # BLD: 0.7 K/UL (ref 0.9–3.6)
LYMPHOCYTES NFR BLD: 17 % (ref 21–52)
MCH RBC QN AUTO: 25.8 PG (ref 24–34)
MCHC RBC AUTO-ENTMCNC: 31.9 G/DL (ref 31–37)
MCV RBC AUTO: 80.8 FL (ref 78–100)
MONOCYTES # BLD: 0.5 K/UL (ref 0.05–1.2)
MONOCYTES NFR BLD: 14 % (ref 3–10)
NEUTS SEG # BLD: 2.6 K/UL (ref 1.8–8)
NEUTS SEG NFR BLD: 68 % (ref 40–73)
NRBC # BLD: 0 K/UL (ref 0–0.01)
NRBC BLD-RTO: 0 PER 100 WBC
PLATELET # BLD AUTO: 248 K/UL (ref 135–420)
PMV BLD AUTO: 11.2 FL (ref 9.2–11.8)
POTASSIUM SERPL-SCNC: 3.4 MMOL/L (ref 3.5–5.5)
PROT SERPL-MCNC: 7.2 G/DL (ref 6.4–8.2)
RBC # BLD AUTO: 4.38 M/UL (ref 4.2–5.3)
SODIUM SERPL-SCNC: 136 MMOL/L (ref 136–145)
TIBC SERPL-MCNC: 218 UG/DL (ref 250–450)
WBC # BLD AUTO: 3.8 K/UL (ref 4.6–13.2)

## 2022-01-17 PROCEDURE — 80053 COMPREHEN METABOLIC PANEL: CPT

## 2022-01-17 PROCEDURE — 36415 COLL VENOUS BLD VENIPUNCTURE: CPT

## 2022-01-17 PROCEDURE — 85025 COMPLETE CBC W/AUTO DIFF WBC: CPT

## 2022-01-17 PROCEDURE — 83540 ASSAY OF IRON: CPT

## 2022-01-17 PROCEDURE — 82728 ASSAY OF FERRITIN: CPT

## 2022-01-24 ENCOUNTER — TELEPHONE (OUTPATIENT)
Age: 87
End: 2022-01-24

## 2022-01-24 ENCOUNTER — VIRTUAL VISIT (OUTPATIENT)
Age: 87
End: 2022-01-24
Payer: MEDICARE

## 2022-01-24 DIAGNOSIS — D75.81 MYELOFIBROSIS (HCC): Primary | ICD-10-CM

## 2022-01-24 DIAGNOSIS — R53.82 CHRONIC FATIGUE: ICD-10-CM

## 2022-01-24 DIAGNOSIS — D64.9 ANEMIA, UNSPECIFIED TYPE: ICD-10-CM

## 2022-01-24 PROCEDURE — 99443 PR PHYS/QHP TELEPHONE EVALUATION 21-30 MIN: CPT | Performed by: INTERNAL MEDICINE

## 2022-01-24 RX ORDER — FERROUS GLUCONATE 324(37.5)
324 TABLET ORAL
Qty: 90 TABLET | Refills: 3 | Status: SHIPPED | OUTPATIENT
Start: 2022-01-24 | End: 2023-01-19

## 2022-01-24 NOTE — TELEPHONE ENCOUNTER
Left a message for patient to call office back to set up for a two month follow up visit with labs a week before

## 2022-01-24 NOTE — PROGRESS NOTES
Salima Ward is a 81 y.o. female, evaluated via audio-only technology on 1/24/2022 for Anemia (Chronic myeloproliferative disorder/Anemia and myelofibrosis)  . Patient is doing subjectively. Assessment & Plan:   Patient is doing subjectively well laboratory tests are also good. Plan to revisit in 2 months with labs with the laboratory tests  12  Subjective:       Prior to Admission medications    Medication Sig Start Date End Date Taking? Authorizing Provider   potassium chloride (K-DUR, KLOR-CON) 20 mEq tablet Take 40MEQ by mouth now. 4/14/21   Raul Hutchins DNP   azelastine (ASTELIN) 137 mcg (0.1 %) nasal spray 1 [place] by Both Nostrils route two (2) times a day. Use in each nostril as directed  Patient not taking: Reported on 6/10/2021 5/18/20   Isaiah Escalera MD   ferrous sulfate (SLOW FE) 142 mg (45 mg iron) ER tablet Take 1 Tab by mouth Daily (before breakfast). 1/24/20   Raul Hutchins DNP   montelukast (SINGULAIR) 10 mg tablet Take 1 Tab by mouth daily. Patient not taking: Reported on 6/10/2021 1/17/20   Isaiah Escalera MD   memantine (NAMENDA) 10 mg tablet TAKE 1 TABLET BY MOUTH TWICE A DAY 9/7/19   Provider, Historical   BYSTOLIC 5 mg tablet TAKE 1 TABLET BY MOUTH EVERY DAY FOR 30 DAYS 10/1/19   Provider, Historical   traZODone (DESYREL) 50 mg tablet Take 50 mg by mouth nightly. Provider, Historical   fluticasone propionate (FLONASE) 50 mcg/actuation nasal spray 2 Sprays by Both Nostrils route daily. Patient not taking: Reported on 6/10/2021 10/11/19   Isaiah Escalera MD   dronabinol (MARINOL) 5 mg capsule Take 1 Cap by mouth two (2) times a day. Max Daily Amount: 10 mg. Patient not taking: Reported on 6/10/2021 2/10/17   Lester Beltran MD   iron polysacch complex-b12-fa (FERREX 150 FORTE) capsule Take 1 Cap by mouth daily. 4/22/14   Ely Gamboa, NP   citalopram (CELEXA) 20 mg tablet Take 20 mg by mouth daily.   Patient not taking: Reported on 6/10/2021    Provider, Historical   ALPRAZolam Valaria Paling) 0.25 mg tablet Take 0.5-1 Tabs by mouth daily as needed. Patient not taking: Reported on 6/10/2021 9/6/13   Julienne Rmairez MD   citalopram (CELEXA) 10 mg tablet Take 1 Tab by mouth daily. Patient not taking: Reported on 6/10/2021 9/6/13   Julienne Ramirez MD   levothyroxine (SYNTHROID) 25 mcg tablet Take 1 Tab by mouth Daily (before breakfast). For Hypothyroidism (thyroid) 3/27/13   Nonnie Kj., NP   ezetimibe (ZETIA) 10 mg tablet Take 1 Tab by mouth daily. For cholesterol 3/27/13   Nonnie Kj., NP   donepezil (ARICEPT) 5 mg tablet Take 1 Tab by mouth nightly. For memory 3/27/13   Nonnie Kj., NP   amLODIPine (NORVASC) 5 mg tablet Take 1 Tab by mouth daily. 3/27/13   Nonnie Kj., NP   calcium-cholecalciferol, D3, (CALCIUM 600 + D) tablet Take 1 Tab by mouth daily. Patient not taking: Reported on 6/10/2021 3/27/13   Nonnie Kj., NP   multivitamin (ONE A DAY) tablet Take 1 Tab by mouth daily. Patient not taking: Reported on 6/10/2021    Provider, Historical   EPOETIN KAYLYN (PROCRIT IJ) by Injection route as needed. 6/9/10   Provider, Historical     Patient Active Problem List   Diagnosis Code    Hypertension I10    Hyperlipidemia E78.5    Family history of colon cancer Z80.0    Chronic fatigue R53.82    Chronic cough R05.3    Hip fracture, left ([facility] [address] S72.002A    Anemia D64.9    Hypothyroidism E03.9    Myelofibrosis ([facility] [address] D75.81    Alzheimer's disease ([facility] [address] G30.9, F02.80    Anorexia R63.0    Chronic myeloproliferative disorder ([facility] [address] D47.1    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 2 Tab 0    azelastine (ASTELIN) 137 mcg (0.1 %) nasal spray 1 [place] by Both Nostrils route two (2) times a day.  Use in each nostril as directed (Patient not taking: Reported on 6/10/2021) 3 Bottle 3    ferrous sulfate (SLOW FE) 142 mg (45 mg iron) ER tablet Take 1 Tab by mouth Daily (before breakfast). 30 Tab 2    montelukast (SINGULAIR) 10 mg tablet Take 1 Tab by mouth daily. (Patient not taking: Reported on 6/10/2021) 90 Tab 3    memantine (NAMENDA) 10 mg tablet TAKE 1 TABLET BY MOUTH TWICE A DAY  3    BYSTOLIC 5 mg tablet TAKE 1 TABLET BY MOUTH EVERY DAY FOR 30 DAYS  0    traZODone (DESYREL) 50 mg tablet Take 50 mg by mouth nightly.  fluticasone propionate (FLONASE) 50 mcg/actuation nasal spray 2 Sprays by Both Nostrils route daily. (Patient not taking: Reported on 6/10/2021) 3 Bottle 3    dronabinol (MARINOL) 5 mg capsule Take 1 Cap by mouth two (2) times a day. Max Daily Amount: 10 mg. (Patient not taking: Reported on 6/10/2021) 60 Cap 3    iron polysacch complex-b12-fa (FERREX 150 FORTE) capsule Take 1 Cap by mouth daily. 30 Cap 2    citalopram (CELEXA) 20 mg tablet Take 20 mg by mouth daily. (Patient not taking: Reported on 6/10/2021)      ALPRAZolam (XANAX) 0.25 mg tablet Take 0.5-1 Tabs by mouth daily as needed. (Patient not taking: Reported on 6/10/2021) 30 Tab 0    citalopram (CELEXA) 10 mg tablet Take 1 Tab by mouth daily. (Patient not taking: Reported on 6/10/2021) 30 Tab 1    levothyroxine (SYNTHROID) 25 mcg tablet Take 1 Tab by mouth Daily (before breakfast). For Hypothyroidism (thyroid) 30 Tab 6    ezetimibe (ZETIA) 10 mg tablet Take 1 Tab by mouth daily. For cholesterol 90 Tab 3    donepezil (ARICEPT) 5 mg tablet Take 1 Tab by mouth nightly. For memory 90 Tab 3    amLODIPine (NORVASC) 5 mg tablet Take 1 Tab by mouth daily. 90 Tab 3    calcium-cholecalciferol, D3, (CALCIUM 600 + D) tablet Take 1 Tab by mouth daily. (Patient not taking: Reported on 6/10/2021) 90 Tab 3    multivitamin (ONE A DAY) tablet Take 1 Tab by mouth daily. (Patient not taking: Reported on 6/10/2021)      EPOETIN KAYLYN (PROCRIT IJ) by Injection route as needed.        Allergies   Allergen Reactions    Aspirin Other (comments) Upset stomach    Crestor [Rosuvastatin] Unknown (comments)    Namenda [Memantine] Unknown (comments)    Zocor [Simvastatin] Other (comments)     Cramps, weakness     Past Medical History:   Diagnosis Date    Alzheimer's disease ([facility] [address]     Anemia     Anemia NEC     Cataract     Glaucoma     Heart murmur     HTN (hypertension)     Hyperlipidemia     Myelofibrosis (HCC)     OA (osteoarthritis)     lumbar spine    Osteopenia          No flowsheet data found. Alicia Daugherty, who was evaluated through a patient-initiated, synchronous (real-time) audio only encounter, and/or her healthcare decision maker, is aware that it is a billable service, with coverage as determined by her insurance carrier. She provided verbal consent to proceed: Yes. She has not had a related appointment within my department in the past 7 days or scheduled within the next 24 hours. On this date 01/24/2022 I have spent 23 minutes reviewing previous notes, test results and face to face (virtual) with the patient discussing the diagnosis and importance of compliance with the treatment plan as well as documenting on the day of the visit.     Ki Polanco MD

## 2023-01-16 ENCOUNTER — LAB ONLY (OUTPATIENT)
Dept: ONCOLOGY | Age: 88
End: 2023-01-16

## 2023-01-16 ENCOUNTER — HOSPITAL ENCOUNTER (OUTPATIENT)
Dept: LAB | Age: 88
Discharge: HOME | End: 2023-01-16
Payer: MEDICARE

## 2023-01-16 DIAGNOSIS — D75.81: ICD-10-CM

## 2023-01-16 DIAGNOSIS — D47.1 CHRONIC MYELOPROLIFERATIVE DISORDER (HCC): ICD-10-CM

## 2023-01-16 DIAGNOSIS — R53.82 CHRONIC FATIGUE: ICD-10-CM

## 2023-01-16 DIAGNOSIS — D75.81 MYELOFIBROSIS (HCC): ICD-10-CM

## 2023-01-16 DIAGNOSIS — D64.9 ANEMIA, UNSPECIFIED TYPE: ICD-10-CM

## 2023-01-16 DIAGNOSIS — D75.81 MYELOFIBROSIS (HCC): Primary | ICD-10-CM

## 2023-01-16 LAB
ALBUMIN SERPL-MCNC: 3.3 G/DL (ref 3.4–5)
ALBUMIN/GLOB SERPL: 0.8 (ref 0.8–1.7)
ALP SERPL-CCNC: 78 U/L (ref 45–117)
ALT SERPL-CCNC: 27 U/L (ref 13–56)
ANION GAP SERPL CALC-SCNC: 5 MMOL/L (ref 3–18)
AST SERPL-CCNC: 18 U/L (ref 10–38)
BASOPHILS # BLD: 0 K/UL (ref 0–0.1)
BASOPHILS NFR BLD: 0 % (ref 0–2)
BILIRUB SERPL-MCNC: 0.6 MG/DL (ref 0.2–1)
BUN SERPL-MCNC: 17 MG/DL (ref 7–18)
BUN/CREAT SERPL: 15 (ref 12–20)
CALCIUM SERPL-MCNC: 9.2 MG/DL (ref 8.5–10.1)
CHLORIDE SERPL-SCNC: 99 MMOL/L (ref 100–111)
CO2 SERPL-SCNC: 31 MMOL/L (ref 21–32)
CREAT SERPL-MCNC: 1.12 MG/DL (ref 0.6–1.3)
DIFFERENTIAL METHOD BLD: ABNORMAL
EOSINOPHIL # BLD: 0.1 K/UL (ref 0–0.4)
EOSINOPHIL NFR BLD: 3 % (ref 0–5)
ERYTHROCYTE [DISTWIDTH] IN BLOOD BY AUTOMATED COUNT: 14.2 % (ref 11.6–14.5)
FERRITIN SERPL-MCNC: 459 NG/ML (ref 8–388)
GLOBULIN SER CALC-MCNC: 4.2 G/DL (ref 2–4)
GLUCOSE SERPL-MCNC: 105 MG/DL (ref 74–99)
HCT VFR BLD AUTO: 38.7 % (ref 35–45)
HGB BLD-MCNC: 12.3 G/DL (ref 12–16)
IMM GRANULOCYTES # BLD AUTO: 0 K/UL (ref 0–0.04)
IMM GRANULOCYTES NFR BLD AUTO: 1 % (ref 0–0.5)
IRON SATN MFR SERPL: 26 % (ref 20–50)
IRON SERPL-MCNC: 63 UG/DL (ref 50–175)
LYMPHOCYTES # BLD: 0.6 K/UL (ref 0.9–3.6)
LYMPHOCYTES NFR BLD: 19 % (ref 21–52)
MCH RBC QN AUTO: 25.9 PG (ref 24–34)
MCHC RBC AUTO-ENTMCNC: 31.8 G/DL (ref 31–37)
MCV RBC AUTO: 81.6 FL (ref 78–100)
MONOCYTES # BLD: 0.6 K/UL (ref 0.05–1.2)
MONOCYTES NFR BLD: 18 % (ref 3–10)
NEUTS SEG # BLD: 1.9 K/UL (ref 1.8–8)
NEUTS SEG NFR BLD: 59 % (ref 40–73)
NRBC # BLD: 0 K/UL (ref 0–0.01)
NRBC BLD-RTO: 0 PER 100 WBC
PLATELET # BLD AUTO: 193 K/UL (ref 135–420)
PMV BLD AUTO: 10.9 FL (ref 9.2–11.8)
POTASSIUM SERPL-SCNC: 3.2 MMOL/L (ref 3.5–5.5)
PROT SERPL-MCNC: 7.5 G/DL (ref 6.4–8.2)
RBC # BLD AUTO: 4.74 M/UL (ref 4.2–5.3)
SODIUM SERPL-SCNC: 135 MMOL/L (ref 136–145)
TIBC SERPL-MCNC: 240 UG/DL (ref 250–450)
WBC # BLD AUTO: 3.3 K/UL (ref 4.6–13.2)

## 2023-01-16 PROCEDURE — 82728 ASSAY OF FERRITIN: CPT

## 2023-01-16 PROCEDURE — 85025 COMPLETE CBC W/AUTO DIFF WBC: CPT

## 2023-01-16 PROCEDURE — 36415 COLL VENOUS BLD VENIPUNCTURE: CPT

## 2023-01-16 PROCEDURE — 80053 COMPREHEN METABOLIC PANEL: CPT

## 2023-01-16 PROCEDURE — 83540 ASSAY OF IRON: CPT

## 2023-01-23 ENCOUNTER — OFFICE VISIT (OUTPATIENT)
Dept: ONCOLOGY | Age: 88
End: 2023-01-23
Payer: MEDICARE

## 2023-01-23 VITALS
TEMPERATURE: 98 F | BODY MASS INDEX: 27.64 KG/M2 | SYSTOLIC BLOOD PRESSURE: 143 MMHG | OXYGEN SATURATION: 100 % | WEIGHT: 156 LBS | DIASTOLIC BLOOD PRESSURE: 80 MMHG | HEART RATE: 72 BPM | HEIGHT: 63 IN

## 2023-01-23 DIAGNOSIS — D64.9 ANEMIA, UNSPECIFIED TYPE: ICD-10-CM

## 2023-01-23 DIAGNOSIS — D72.819 CHRONIC LEUKOPENIA: Primary | ICD-10-CM

## 2023-01-23 DIAGNOSIS — D75.81 MYELOFIBROSIS (HCC): ICD-10-CM

## 2023-01-23 PROCEDURE — G0463 HOSPITAL OUTPT CLINIC VISIT: HCPCS | Performed by: INTERNAL MEDICINE

## 2023-01-23 NOTE — PROGRESS NOTES
Hematology/Oncology  Progress Note    Name: Dayana Mckeon  Date: 2023  : 1935    Danny Mckinney MD     Ms. Tobias Card is a 81y.o. year old female who was seen for chronic myeloproliferative disorder/Anemia and myelofibrosis       Subjective:   Mrs. Tobais Card is an 31-year-old [facility] American woman with past medical history of Alzheimer's dementia who has a long-standing history of myelofibrosis and myeloproliferative neoplasm was being followed by Dr. Jose Guadalupe Farmer who retired. Per her previous note she had multiple Procrit shots. -- Patient was being followed by Dr. Jenna Aviles who left the practice. The patient presents to HBV clinic today for follows up. Today patient denies any fevers, recurrent infections, chills, shortness of breath, nausea vomiting or abdominal pain. No focal neurologic deficit. No melena or bright red blood per rectum. Denies pain or discomfort. Patient daughter report that she does have some occasional fatigue. Past medical history, family history, and social history: these were reviewed and remains unchanged. Past Medical History:   Diagnosis Date    Alzheimer's disease ([facility] [address]     Anemia     Anemia NEC     Cataract     Glaucoma     Heart murmur     HTN (hypertension)     Hyperlipidemia     Myelofibrosis (HCC)     OA (osteoarthritis)     lumbar spine    Osteopenia      Past Surgical History:   Procedure Laterality Date    HX HYSTERECTOMY      vaginal    IA APPENDECTOMY       Social History     Socioeconomic History    Marital status:      Spouse name: Not on file    Number of children: Not on file    Years of education: Not on file    Highest education level: Not on file   Occupational History    Occupation: retired   Tobacco Use    Smoking status: Never    Smokeless tobacco: Never   Substance and Sexual Activity    Alcohol use:  Yes     Alcohol/week: 0.8 standard drinks     Types: 1 Glasses of wine per week     Comment: occassional    Drug use: No    Sexual activity: Not on file   Other Topics Concern    Not on file   Social History Narrative    Not on file     Social Determinants of Health     Financial Resource Strain: Not on file   Food Insecurity: Not on file   Transportation Needs: Not on file   Physical Activity: Not on file   Stress: Not on file   Social Connections: Not on file   Intimate Partner Violence: Not on file   Housing Stability: Not on file     Family History   Problem Relation Age of Onset    Cancer Mother         colon    Anemia Maternal Aunt     Osteoporosis Maternal Grandmother      Current Outpatient Medications   Medication Sig Dispense Refill    ferrous gluconate 324 mg (37.5 mg iron) tablet Take 1 Tablet by mouth Daily (before breakfast) for 360 days. Indications: anemia from inadequate iron 90 Tablet 3    potassium chloride (K-DUR, KLOR-CON) 20 mEq tablet Take 40MEQ by mouth now. 2 Tab 0    azelastine (ASTELIN) 137 mcg (0.1 %) nasal spray 1 [place] by Both Nostrils route two (2) times a day. Use in each nostril as directed (Patient not taking: Reported on 6/10/2021) 3 Bottle 3    montelukast (SINGULAIR) 10 mg tablet Take 1 Tab by mouth daily. (Patient not taking: Reported on 6/10/2021) 90 Tab 3    memantine (NAMENDA) 10 mg tablet TAKE 1 TABLET BY MOUTH TWICE A DAY  3    BYSTOLIC 5 mg tablet TAKE 1 TABLET BY MOUTH EVERY DAY FOR 30 DAYS  0    traZODone (DESYREL) 50 mg tablet Take 50 mg by mouth nightly. fluticasone propionate (FLONASE) 50 mcg/actuation nasal spray 2 Sprays by Both Nostrils route daily. (Patient not taking: Reported on 6/10/2021) 3 Bottle 3    dronabinol (MARINOL) 5 mg capsule Take 1 Cap by mouth two (2) times a day. Max Daily Amount: 10 mg. (Patient not taking: Reported on 6/10/2021) 60 Cap 3    citalopram (CELEXA) 20 mg tablet Take 20 mg by mouth daily. (Patient not taking: Reported on 6/10/2021)      ALPRAZolam (XANAX) 0.25 mg tablet Take 0.5-1 Tabs by mouth daily as needed.  (Patient not taking: Reported on 6/10/2021) 30 Tab 0 citalopram (CELEXA) 10 mg tablet Take 1 Tab by mouth daily. (Patient not taking: Reported on 6/10/2021) 30 Tab 1    levothyroxine (SYNTHROID) 25 mcg tablet Take 1 Tab by mouth Daily (before breakfast). For Hypothyroidism (thyroid) 30 Tab 6    ezetimibe (ZETIA) 10 mg tablet Take 1 Tab by mouth daily. For cholesterol 90 Tab 3    donepezil (ARICEPT) 5 mg tablet Take 1 Tab by mouth nightly. For memory 90 Tab 3    amLODIPine (NORVASC) 5 mg tablet Take 1 Tab by mouth daily. 90 Tab 3    calcium-cholecalciferol, D3, (CALCIUM 600 + D) tablet Take 1 Tab by mouth daily. (Patient not taking: Reported on 6/10/2021) 90 Tab 3    multivitamin (ONE A DAY) tablet Take 1 Tab by mouth daily. (Patient not taking: Reported on 6/10/2021)      EPOETIN KAYLYN (PROCRIT IJ) by Injection route as needed. Review of Systems  Constitutional: The patient has no acute distress or discomfort. HEENT: The patient denies recent head trauma, eye pain, blurred vision,  hearing deficit, oropharyngeal mucosal pain or lesions, and the patient denies throat pain or discomfort. Lymphatics: The patient denies palpable peripheral lymphadenopathy. Hematologic: The patient denies having bruising, bleeding, or progressive fatigue. Respiratory: Patient denies having shortness of breath, cough, sputum production, fever, or dyspnea on exertion. Cardiovascular: The patient denies having leg pain, leg swelling, heart palpitations, chest permit, chest pain, or lightheadedness. The patient denies having dyspnea on exertion. Gastrointestinal: The patient denies having nausea, emesis, or diarrhea. The patient denies having any hematemesis or blood in the stool. Genitourinary: Patient denies having urinary urgency, frequency, or dysuria. The patient denies having blood in the urine. Psychological: The patient denies having symptoms of nervousness, anxiety, depression, or thoughts of harming himself some of this.   Skin: Patient denies having skin rashes, skin, ulcerations, or unexplained itching or pruritus. Musculoskeletal: The patient denies having pain in the joints or bones. Objective:   Visit Vitals  BP (!) 143/80   Pulse 72   Temp 98 °F (36.7 °C)   Ht 5' 3\" (1.6 m)   Wt 70.8 kg (156 lb)   SpO2 100%   BMI 27.63 kg/m²     ECOG PS 0  Physical Exam:   Gen. Appearance: The patient is in no acute distress. Skin: There is no bruise or rash. HEENT: The exam is unremarkable. Neck: Supple without lymphadenopathy or thyromegaly. Lungs: Clear to auscultation and percussion; there are no wheezes or rhonchi. Heart: Regular rate and rhythm; there are no murmurs, gallops, or rubs. Abdomen: Bowel sounds are present and normal.  There is no guarding, tenderness, or hepatosplenomegaly. Extremities: There is no clubbing, cyanosis, or edema. Neurologic: There are no focal neurologic deficits. Lymphatics: There is no palpable peripheral lymphadenopathy. Musculoskeletal: The patient has full range of motion at all joints. There is no evidence of joint deformity or effusions. There is no focal joint tenderness. Psychological/psychiatric: There is no clinical evidence of anxiety, depression, or melancholy. Lab data:      Results for orders placed or performed during the hospital encounter of 04/13/21   CBC WITH 3 PART DIFF     Status: Abnormal   Result Value Ref Range Status    WBC 2.8 (L) 4.5 - 13.0 K/uL Final    RBC 4.70 4. 10 - 5.10 M/uL Final    HGB 12.3 12.0 - 16.0 g/dL Final    HCT 39.0 36 - 48 % Final    MCV 83.0 78 - 102 FL Final    MCH 26.2 25.0 - 35.0 PG Final    MCHC 31.5 31 - 37 g/dL Final    RDW 15.0 (H) 11.5 - 14.5 % Final    PLATELET 497 998 - 691 K/uL Final    NEUTROPHILS 58 40 - 70 % Final    Mixed cells 12 0.1 - 17 % Final    LYMPHOCYTES 31 14 - 44 % Final    ABS. NEUTROPHILS 1.6 (L) 1.8 - 9.5 K/UL Final    ABS. MIXED CELLS 0.3 0.0 - 2.3 K/uL Final    ABS.  LYMPHOCYTES 0.9 (L) 1.1 - 5.9 K/UL Final     Comment: Test performed at [facility] [address] Oncology or Outpatient Infusion Center Location. Reviewed by Medical Director. DF AUTOMATED   Final           Assessment:     1. Chronic leukopenia    2. Anemia, unspecified type    3. Myelofibrosis ([address]        Assessment and Plan:   Chronic Angelique Montague  --The patient has history of slowly progressive myelofibrosis and associated anemia as per previous hematologist Dr. Arin Irene who retired. -- Her recent labs reviewed  However all her labs are normal on 04/13/21 with mild neutropenia and lymphopenia with no repeated infections. -- Patient was being followed by Dr. Lowell Rodriguez who left the practice. The patient presents to HBV clinic today for follows up. --Labs on 01/16/2023 showed WBC 3.3 K/uL, hemoglobin 12.3 with hematocrit of 38.7%. Platelet 547. ANC 1.9 with ALC 0.6. Ferritin 459. Iron 63 with transferrin sat of 26%. -- She has not required the use of Procrit for a while   -- Recheck labs with CBC, Nutritional panel, and ret count in 6 months  --Follow up in 6 months or sooner if indicated    No orders of the defined types were placed in this encounter. Florencia Rondon MD  1/23/2023    The patient has a reminder for a \"due or due soon\" health maintenance. I have asked the patient to contact primary care provider for follow-up on the health maintenance. All of patient's questions answered to their apparent satisfaction. They verbally show understanding and agreement with aforementioned plan. Above mentioned total time spent for this encounter with more than 50% of the time spent in face-to-face counseling, reviewing previous medical charts, discussing on diagnosis and management plan going forward, and co-ordination of care. Please note: This document has been produced using voice recognition software. Unrecognized errors in transcription may be present.       CC: Jayesh Fields MD

## 2023-01-26 DIAGNOSIS — D75.81 MYELOFIBROSIS (HCC): Primary | ICD-10-CM

## 2023-01-26 RX ORDER — POTASSIUM CHLORIDE 750 MG/1
10 TABLET, EXTENDED RELEASE ORAL DAILY
Qty: 15 TABLET | Refills: 0 | Status: SHIPPED | OUTPATIENT
Start: 2023-01-26

## 2024-03-21 ENCOUNTER — HOSPITAL ENCOUNTER (OUTPATIENT)
Facility: HOSPITAL | Age: 89
Discharge: HOME | End: 2024-03-21
Payer: MEDICARE

## 2024-03-21 DIAGNOSIS — R94.5 ABNORMAL RESULTS OF LIVER FUNCTION STUDIES: ICD-10-CM

## 2024-03-21 PROCEDURE — 76705 ECHO EXAM OF ABDOMEN: CPT

## 2024-04-19 ENCOUNTER — HOSPITAL ENCOUNTER (OUTPATIENT)
Facility: HOSPITAL | Age: 89
End: 2024-04-19
Payer: MEDICARE

## 2024-04-19 DIAGNOSIS — R93.89 ABNORMAL ULTRASOUND: ICD-10-CM

## 2024-04-19 LAB — CREAT UR-MCNC: 0.9 MG/DL (ref 0.6–1.3)

## 2024-04-19 PROCEDURE — 74160 CT ABDOMEN W/CONTRAST: CPT

## 2024-04-19 PROCEDURE — 82565 ASSAY OF CREATININE: CPT

## 2024-04-19 PROCEDURE — 6360000004 HC RX CONTRAST MEDICATION: Performed by: INTERNAL MEDICINE

## 2024-04-19 RX ADMIN — IOPAMIDOL 80 ML: 612 INJECTION, SOLUTION INTRAVENOUS at 15:48

## 2024-07-22 ENCOUNTER — HOSPITAL ENCOUNTER (OUTPATIENT)
Facility: HOSPITAL | Age: 89
Discharge: HOME | End: 2024-07-25
Payer: MEDICARE

## 2024-07-22 DIAGNOSIS — M79.671 RIGHT FOOT PAIN: ICD-10-CM

## 2024-07-22 PROCEDURE — 73620 X-RAY EXAM OF FOOT: CPT

## 2024-07-22 PROCEDURE — 73600 X-RAY EXAM OF ANKLE: CPT

## 2025-01-08 ENCOUNTER — HOSPITAL ENCOUNTER (EMERGENCY)
Facility: HOSPITAL | Age: 89
Discharge: HOME | End: 2025-01-11
Payer: MEDICARE

## 2025-01-08 ENCOUNTER — HOSPITAL ENCOUNTER (EMERGENCY)
Facility: HOSPITAL | Age: 89
Discharge: HOME | End: 2025-01-09
Attending: STUDENT IN AN ORGANIZED HEALTH CARE EDUCATION/TRAINING PROGRAM
Payer: MEDICARE
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
## 2025-01-08 DIAGNOSIS — G89.29 CHRONIC MIDLINE LOW BACK PAIN WITHOUT SCIATICA: ICD-10-CM

## 2025-01-08 DIAGNOSIS — M54.50 CHRONIC MIDLINE LOW BACK PAIN WITHOUT SCIATICA: ICD-10-CM

## 2025-01-08 PROCEDURE — 72125 CT NECK SPINE W/O DYE: CPT

## 2025-01-08 PROCEDURE — 93005 ELECTROCARDIOGRAM TRACING: CPT | Performed by: STUDENT IN AN ORGANIZED HEALTH CARE EDUCATION/TRAINING PROGRAM

## 2025-01-08 PROCEDURE — 72131 CT LUMBAR SPINE W/O DYE: CPT

## 2025-01-08 PROCEDURE — 70450 CT HEAD/BRAIN W/O DYE: CPT

## 2025-01-08 PROCEDURE — 6370000000 HC RX 637 (ALT 250 FOR IP): Performed by: STUDENT IN AN ORGANIZED HEALTH CARE EDUCATION/TRAINING PROGRAM

## 2025-01-08 PROCEDURE — 99284 EMERGENCY DEPT VISIT MOD MDM: CPT

## 2025-01-08 RX ORDER — ACETAMINOPHEN 325 MG/1
650 TABLET ORAL
Status: COMPLETED | OUTPATIENT
Start: 2025-01-08 | End: 2025-01-08

## 2025-01-08 RX ADMIN — ACETAMINOPHEN 325MG 650 MG: 325 TABLET ORAL at 22:55

## 2025-01-09 VITALS
SYSTOLIC BLOOD PRESSURE: 121 MMHG | OXYGEN SATURATION: 100 % | TEMPERATURE: 97.5 F | RESPIRATION RATE: 26 BRPM | BODY MASS INDEX: 27.63 KG/M2 | WEIGHT: 156 LBS | HEART RATE: 54 BPM | DIASTOLIC BLOOD PRESSURE: 85 MMHG

## 2025-01-09 LAB
EKG DIAGNOSIS: NORMAL
EKG Q-T INTERVAL: 384 MS
EKG QRS DURATION: 80 MS
EKG QTC CALCULATION (BAZETT): 411 MS
EKG R AXIS: 17 DEGREES
EKG T AXIS: 21 DEGREES
EKG VENTRICULAR RATE: 69 BPM

## 2025-01-09 PROCEDURE — 93010 ELECTROCARDIOGRAM REPORT: CPT | Performed by: INTERNAL MEDICINE

## 2025-01-09 NOTE — DISCHARGE INSTRUCTIONS
You were evaluated for low back pain and fall .  Based on your work-up it was deemed that she was stable for discharge.    Please follow-up with your primary care physician if you have any further concerns and go over your work-up.  If you experience any chest pain, shortness of breath, worsening abdominal pain, vomiting blood, worsening headache, seizures, or any worsening of your symptoms please return to the emergency department immediately.  If you have any pending results or any further questions please contact the emergency department at [phone].

## 2025-01-09 NOTE — ED PROVIDER NOTES
EMERGENCY DEPARTMENT HISTORY AND PHYSICAL EXAM    1:13 AM      Date: 1/8/2025  Patient Name: Zoraida Hilton    History of Presenting Illness     Chief Complaint   Patient presents with    Fall       History From: Patient and patient      Zoraida Hilton is a 89 y.o. female   HPI  89-year-old female with history of myeloproliferative disorder, hyperlipidemia, Alzheimer's dementia, hypertension who presents with fall, back pain.  Patient was in the bathroom and sustained a mechanical fall.  Does not remember she had any head trauma.  At this time she is endorsing some low back pain.  Mild to moderate, throbbing, aching.  Movement aggravates symptoms.  No alleviating factors.  She denies any other pain or injury at this time.  When assessing ROS she has no nausea, vomiting, chest pain, abdominal pain, changes in bowel movement, saddle anesthesia, urinary incontinence, or any other changes.    Nursing Notes were all reviewed and agreed with or any disagreements were addressed in the HPI.    PCP: Nirali Ann MD    No current facility-administered medications for this encounter.     Current Outpatient Medications   Medication Sig Dispense Refill    ALPRAZolam (XANAX) 0.25 MG tablet Take 0.125-0.25 mg by mouth daily as needed.      amLODIPine (NORVASC) 5 MG tablet Take 5 mg by mouth daily      azelastine (ASTELIN) 0.1 % nasal spray 1 spray by Nasal route 2 times daily      calcium carb-cholecalciferol 600-10 MG-MCG TABS per tab Take 1 tablet by mouth daily      citalopram (CELEXA) 10 MG tablet Take 10 mg by mouth daily      citalopram (CELEXA) 20 MG tablet Take 20 mg by mouth daily      donepezil (ARICEPT) 5 MG tablet Take 5 mg by mouth      dronabinol (MARINOL) 5 MG capsule Take 5 mg by mouth 2 times daily.      ezetimibe (ZETIA) 10 MG tablet Take 10 mg by mouth daily      fluticasone (FLONASE) 50 MCG/ACT nasal spray 2 sprays by Nasal route daily      levothyroxine (SYNTHROID) 25 MCG tablet Take 25 mcg by

## 2025-01-09 NOTE — ED TRIAGE NOTES
Patient arrived EMS from home with s/p fall. Patient states that she lost her balance and fell while in the bathroom. No LOC, no head injury. Denies any pain on arrival. NAD.

## 2025-02-05 ENCOUNTER — OFFICE VISIT (OUTPATIENT)
Facility: CLINIC | Age: 89
End: 2025-02-05

## 2025-02-05 ENCOUNTER — HOSPITAL ENCOUNTER (OUTPATIENT)
Facility: HOSPITAL | Age: 89
Setting detail: SPECIMEN
Discharge: HOME | End: 2025-02-08
Payer: MEDICARE

## 2025-02-05 VITALS
WEIGHT: 145.2 LBS | TEMPERATURE: 97 F | SYSTOLIC BLOOD PRESSURE: 124 MMHG | OXYGEN SATURATION: 98 % | HEIGHT: 64 IN | DIASTOLIC BLOOD PRESSURE: 72 MMHG | RESPIRATION RATE: 14 BRPM | HEART RATE: 75 BPM | BODY MASS INDEX: 24.79 KG/M2

## 2025-02-05 DIAGNOSIS — D75.81 MYELOFIBROSIS (HCC): ICD-10-CM

## 2025-02-05 DIAGNOSIS — I10 PRIMARY HYPERTENSION: ICD-10-CM

## 2025-02-05 DIAGNOSIS — Z11.59 ENCOUNTER FOR HEPATITIS C SCREENING TEST FOR LOW RISK PATIENT: ICD-10-CM

## 2025-02-05 DIAGNOSIS — S22.080A COMPRESSION FRACTURE OF T12 VERTEBRA, INITIAL ENCOUNTER (HCC): ICD-10-CM

## 2025-02-05 DIAGNOSIS — F02.80 ALZHEIMER'S DISEASE (HCC): ICD-10-CM

## 2025-02-05 DIAGNOSIS — D47.1 CHRONIC MYELOPROLIFERATIVE DISORDER (HCC): ICD-10-CM

## 2025-02-05 DIAGNOSIS — G30.9 ALZHEIMER'S DISEASE (HCC): ICD-10-CM

## 2025-02-05 DIAGNOSIS — E78.5 HYPERLIPIDEMIA, UNSPECIFIED HYPERLIPIDEMIA TYPE: ICD-10-CM

## 2025-02-05 DIAGNOSIS — E03.9 HYPOTHYROIDISM, UNSPECIFIED TYPE: ICD-10-CM

## 2025-02-05 DIAGNOSIS — Z91.81 AT HIGH RISK FOR FALLS: ICD-10-CM

## 2025-02-05 DIAGNOSIS — Z76.89 ESTABLISHING CARE WITH NEW DOCTOR, ENCOUNTER FOR: ICD-10-CM

## 2025-02-05 DIAGNOSIS — I10 PRIMARY HYPERTENSION: Primary | ICD-10-CM

## 2025-02-05 DIAGNOSIS — M81.0 OSTEOPOROSIS, UNSPECIFIED OSTEOPOROSIS TYPE, UNSPECIFIED PATHOLOGICAL FRACTURE PRESENCE: ICD-10-CM

## 2025-02-05 DIAGNOSIS — M47.815 OSTEOARTHRITIS OF THORACOLUMBAR SPINE, UNSPECIFIED SPINAL OSTEOARTHRITIS COMPLICATION STATUS: ICD-10-CM

## 2025-02-05 DIAGNOSIS — E87.6 HYPOKALEMIA: ICD-10-CM

## 2025-02-05 LAB
ALBUMIN SERPL-MCNC: 2.9 G/DL (ref 3.4–5)
ALBUMIN/GLOB SERPL: 0.7 (ref 0.8–1.7)
ALP SERPL-CCNC: 63 U/L (ref 45–117)
ALT SERPL-CCNC: 13 U/L (ref 13–56)
ANION GAP SERPL CALC-SCNC: 7 MMOL/L (ref 3–18)
AST SERPL-CCNC: 16 U/L (ref 10–38)
BASOPHILS # BLD: 0.01 K/UL (ref 0–0.1)
BASOPHILS NFR BLD: 0.3 % (ref 0–2)
BILIRUB SERPL-MCNC: 0.5 MG/DL (ref 0.2–1)
BUN SERPL-MCNC: 12 MG/DL (ref 7–18)
BUN/CREAT SERPL: 13 (ref 12–20)
CALCIUM SERPL-MCNC: 9.3 MG/DL (ref 8.5–10.1)
CHLORIDE SERPL-SCNC: 98 MMOL/L (ref 100–111)
CHOLEST SERPL-MCNC: 224 MG/DL
CO2 SERPL-SCNC: 32 MMOL/L (ref 21–32)
CREAT SERPL-MCNC: 0.91 MG/DL (ref 0.6–1.3)
DIFFERENTIAL METHOD BLD: ABNORMAL
EOSINOPHIL # BLD: 0.02 K/UL (ref 0–0.4)
EOSINOPHIL NFR BLD: 0.5 % (ref 0–5)
ERYTHROCYTE [DISTWIDTH] IN BLOOD BY AUTOMATED COUNT: 15.1 % (ref 11.6–14.5)
GLOBULIN SER CALC-MCNC: 4.4 G/DL (ref 2–4)
GLUCOSE SERPL-MCNC: 106 MG/DL (ref 74–99)
HCT VFR BLD AUTO: 40.1 % (ref 35–45)
HDLC SERPL-MCNC: 67 MG/DL (ref 40–60)
HDLC SERPL: 3.3 (ref 0–5)
HGB BLD-MCNC: 12.3 G/DL (ref 12–16)
IMM GRANULOCYTES # BLD AUTO: 0.05 K/UL (ref 0–0.04)
IMM GRANULOCYTES NFR BLD AUTO: 1.3 % (ref 0–0.5)
LDLC SERPL CALC-MCNC: 135.4 MG/DL (ref 0–100)
LIPID PANEL: ABNORMAL
LYMPHOCYTES # BLD: 0.92 K/UL (ref 0.9–3.6)
LYMPHOCYTES NFR BLD: 23.4 % (ref 21–52)
MCH RBC QN AUTO: 25.6 PG (ref 24–34)
MCHC RBC AUTO-ENTMCNC: 30.7 G/DL (ref 31–37)
MCV RBC AUTO: 83.5 FL (ref 78–100)
MONOCYTES # BLD: 0.47 K/UL (ref 0.05–1.2)
MONOCYTES NFR BLD: 12 % (ref 3–10)
NEUTS SEG # BLD: 2.46 K/UL (ref 1.8–8)
NEUTS SEG NFR BLD: 62.5 % (ref 40–73)
NRBC # BLD: 0 K/UL (ref 0–0.01)
NRBC BLD-RTO: 0 PER 100 WBC
PLATELET # BLD AUTO: 250 K/UL (ref 135–420)
PMV BLD AUTO: 11.8 FL (ref 9.2–11.8)
POTASSIUM SERPL-SCNC: 2.9 MMOL/L (ref 3.5–5.5)
PROT SERPL-MCNC: 7.3 G/DL (ref 6.4–8.2)
RBC # BLD AUTO: 4.8 M/UL (ref 4.2–5.3)
SODIUM SERPL-SCNC: 137 MMOL/L (ref 136–145)
T4 FREE SERPL-MCNC: 1.3 NG/DL (ref 0.7–1.5)
TRIGL SERPL-MCNC: 108 MG/DL
TSH SERPL DL<=0.05 MIU/L-ACNC: 2.55 UIU/ML (ref 0.36–3.74)
VLDLC SERPL CALC-MCNC: 21.6 MG/DL
WBC # BLD AUTO: 3.9 K/UL (ref 4.6–13.2)

## 2025-02-05 PROCEDURE — 80061 LIPID PANEL: CPT

## 2025-02-05 PROCEDURE — 80053 COMPREHEN METABOLIC PANEL: CPT

## 2025-02-05 PROCEDURE — 86803 HEPATITIS C AB TEST: CPT

## 2025-02-05 PROCEDURE — 36415 COLL VENOUS BLD VENIPUNCTURE: CPT

## 2025-02-05 PROCEDURE — 84443 ASSAY THYROID STIM HORMONE: CPT

## 2025-02-05 PROCEDURE — 85025 COMPLETE CBC W/AUTO DIFF WBC: CPT

## 2025-02-05 PROCEDURE — 84439 ASSAY OF FREE THYROXINE: CPT

## 2025-02-05 RX ORDER — SENNOSIDES 8.6 MG
CAPSULE ORAL
Qty: 60 TABLET | Refills: 3 | Status: SHIPPED | OUTPATIENT
Start: 2025-02-05

## 2025-02-05 RX ORDER — CALCIUM CARBONATE/VITAMIN D3 600 MG-10
1 TABLET ORAL DAILY
Qty: 90 TABLET | Refills: 0 | Status: SHIPPED | OUTPATIENT
Start: 2025-02-05

## 2025-02-05 SDOH — ECONOMIC STABILITY: FOOD INSECURITY: WITHIN THE PAST 12 MONTHS, YOU WORRIED THAT YOUR FOOD WOULD RUN OUT BEFORE YOU GOT MONEY TO BUY MORE.: NEVER TRUE

## 2025-02-05 SDOH — HEALTH STABILITY: PHYSICAL HEALTH: ON AVERAGE, HOW MANY DAYS PER WEEK DO YOU ENGAGE IN MODERATE TO STRENUOUS EXERCISE (LIKE A BRISK WALK)?: 0 DAYS

## 2025-02-05 SDOH — ECONOMIC STABILITY: FOOD INSECURITY: WITHIN THE PAST 12 MONTHS, THE FOOD YOU BOUGHT JUST DIDN'T LAST AND YOU DIDN'T HAVE MONEY TO GET MORE.: NEVER TRUE

## 2025-02-05 SDOH — HEALTH STABILITY: PHYSICAL HEALTH: ON AVERAGE, HOW MANY MINUTES DO YOU ENGAGE IN EXERCISE AT THIS LEVEL?: 0 MIN

## 2025-02-05 ASSESSMENT — SOCIAL DETERMINANTS OF HEALTH (SDOH)
HOW HARD IS IT FOR YOU TO PAY FOR THE VERY BASICS LIKE FOOD, HOUSING, MEDICAL CARE, AND HEATING?: NOT HARD AT ALL
HOW OFTEN DO YOU GET TOGETHER WITH FRIENDS OR RELATIVES?: MORE THAN THREE TIMES A WEEK
HOW OFTEN DO YOU ATTENT MEETINGS OF THE CLUB OR ORGANIZATION YOU BELONG TO?: NEVER
IN A TYPICAL WEEK, HOW MANY TIMES DO YOU TALK ON THE PHONE WITH FAMILY, FRIENDS, OR NEIGHBORS?: THREE TIMES A WEEK
WITHIN THE LAST YEAR, HAVE TO BEEN RAPED OR FORCED TO HAVE ANY KIND OF SEXUAL ACTIVITY BY YOUR PARTNER OR EX-PARTNER?: NO
DO YOU BELONG TO ANY CLUBS OR ORGANIZATIONS SUCH AS CHURCH GROUPS UNIONS, FRATERNAL OR ATHLETIC GROUPS, OR SCHOOL GROUPS?: NO
WITHIN THE LAST YEAR, HAVE YOU BEEN AFRAID OF YOUR PARTNER OR EX-PARTNER?: NO
HOW OFTEN DO YOU ATTEND CHURCH OR RELIGIOUS SERVICES?: NEVER
WITHIN THE LAST YEAR, HAVE YOU BEEN KICKED, HIT, SLAPPED, OR OTHERWISE PHYSICALLY HURT BY YOUR PARTNER OR EX-PARTNER?: NO
WITHIN THE LAST YEAR, HAVE YOU BEEN HUMILIATED OR EMOTIONALLY ABUSED IN OTHER WAYS BY YOUR PARTNER OR EX-PARTNER?: NO

## 2025-02-05 ASSESSMENT — PATIENT HEALTH QUESTIONNAIRE - PHQ9
2. FEELING DOWN, DEPRESSED OR HOPELESS: SEVERAL DAYS
1. LITTLE INTEREST OR PLEASURE IN DOING THINGS: SEVERAL DAYS
SUM OF ALL RESPONSES TO PHQ QUESTIONS 1-9: 2
SUM OF ALL RESPONSES TO PHQ QUESTIONS 1-9: 2
SUM OF ALL RESPONSES TO PHQ9 QUESTIONS 1 & 2: 2
SUM OF ALL RESPONSES TO PHQ QUESTIONS 1-9: 2
SUM OF ALL RESPONSES TO PHQ QUESTIONS 1-9: 2

## 2025-02-05 ASSESSMENT — ENCOUNTER SYMPTOMS
ABDOMINAL PAIN: 0
SHORTNESS OF BREATH: 0
BACK PAIN: 1

## 2025-02-05 ASSESSMENT — LIFESTYLE VARIABLES
HOW MANY STANDARD DRINKS CONTAINING ALCOHOL DO YOU HAVE ON A TYPICAL DAY: PATIENT DOES NOT DRINK
HOW OFTEN DO YOU HAVE A DRINK CONTAINING ALCOHOL: NEVER

## 2025-02-05 NOTE — PROGRESS NOTES
HISTORY OF PRESENT ILLNESS  Zoraida Hilton is a 89 y.o. female presenting today for   Chief Complaint   Patient presents with    New Patient    Establish Care      Here to establish care:  Last PCP visit was Aug 2024, accompanied with her daughter who helps present HPI:    Fall- Sustained a fall on 1/8/25 after losing her balance while in the bathroom. At the time of arrival she denied any pain, hitting head or LOC. EKG was unremarkable. She was given tylenol 650mg and d/c home. She does have hx of falls.     Back pain-This is chronic. Hx DJD and previous T12 compression fracture.  Per daughter states that this is something she often complains but was unsure what to give her to help with this, also has a history of osteoporosis not currently taking calcium and vitamin D.    Alzheimer's- Taking Donepezil 5mg and trazodone 50 mg nightly.    HTN-Taking Amlodipine 5mg and Bistolic 5mg daily.  Denies changes in vision hearing or headaches.     Myeloproliferative disorder- F/w hem/oncology at [facility].  She has chronic neutropenia which is followed every 3 months,  no evidence of clonal abnormalities on her blood work. Family has delcined bone marrow biposy at this time.           Past Medical History:   Diagnosis Date    Alzheimer's disease (HCC)     Anemia     Cataract     Glaucoma     Heart murmur     HTN (hypertension)     Hyperlipidemia     Myelofibrosis (HCC)     OA (osteoarthritis)     lumbar spine    Osteopenia        Medications reviewed and updated.    Review of Systems   Eyes:  Negative for visual disturbance.   Respiratory:  Negative for shortness of breath.    Cardiovascular:  Negative for chest pain.   Gastrointestinal:  Negative for abdominal pain.   Musculoskeletal:  Positive for back pain (chronic).   Neurological:  Negative for headaches.         /72 (Site: Left Upper Arm, Position: Sitting, Cuff Size: Small Adult)   Pulse 75   Temp 97 °F (36.1 °C) (Temporal)   Resp 14   Ht 1.626 m (5' 4\")

## 2025-02-06 LAB
HCV AB SER IA-ACNC: 0.14 INDEX
HCV AB SERPL QL IA: NEGATIVE
HEPATITIS C COMMENT: NORMAL

## 2025-02-06 RX ORDER — POTASSIUM CHLORIDE 1500 MG/1
20 TABLET, EXTENDED RELEASE ORAL 2 TIMES DAILY
Qty: 60 TABLET | Refills: 0 | Status: SHIPPED | OUTPATIENT
Start: 2025-02-06

## 2025-02-06 NOTE — RESULT ENCOUNTER NOTE
Your potassium is low, I am sending in a potassium supplement for you to take twice daily for the next 30 days.  In addition I would suggest eating more foods that have potassium in it such as sweet potatoes, tomatoes, spinach, kale, papaya and bananas to name a few

## 2025-03-03 ENCOUNTER — PATIENT MESSAGE (OUTPATIENT)
Facility: CLINIC | Age: 89
End: 2025-03-03

## 2025-03-03 DIAGNOSIS — Z91.81 AT HIGH RISK FOR FALLS: ICD-10-CM

## 2025-03-03 DIAGNOSIS — S22.080A COMPRESSION FRACTURE OF T12 VERTEBRA, INITIAL ENCOUNTER (HCC): Primary | ICD-10-CM

## 2025-03-03 DIAGNOSIS — R26.2 AMBULATORY DYSFUNCTION: ICD-10-CM

## 2025-03-03 DIAGNOSIS — M47.815 OSTEOARTHRITIS OF THORACOLUMBAR SPINE, UNSPECIFIED SPINAL OSTEOARTHRITIS COMPLICATION STATUS: ICD-10-CM

## 2025-06-26 ENCOUNTER — HOSPITAL ENCOUNTER (OUTPATIENT)
Facility: HOSPITAL | Age: 89
Setting detail: SPECIMEN
Discharge: HOME | End: 2025-06-29
Payer: MEDICARE

## 2025-06-26 ENCOUNTER — OFFICE VISIT (OUTPATIENT)
Facility: CLINIC | Age: 89
End: 2025-06-26
Payer: MEDICARE

## 2025-06-26 VITALS
HEART RATE: 76 BPM | TEMPERATURE: 97.3 F | OXYGEN SATURATION: 98 % | BODY MASS INDEX: 22.61 KG/M2 | HEIGHT: 64 IN | WEIGHT: 132.4 LBS | DIASTOLIC BLOOD PRESSURE: 81 MMHG | RESPIRATION RATE: 16 BRPM | SYSTOLIC BLOOD PRESSURE: 136 MMHG

## 2025-06-26 DIAGNOSIS — E87.6 HYPOKALEMIA: ICD-10-CM

## 2025-06-26 DIAGNOSIS — M81.0 OSTEOPOROSIS, UNSPECIFIED OSTEOPOROSIS TYPE, UNSPECIFIED PATHOLOGICAL FRACTURE PRESENCE: ICD-10-CM

## 2025-06-26 DIAGNOSIS — R60.0 LOWER LEG EDEMA: ICD-10-CM

## 2025-06-26 DIAGNOSIS — F02.80 ALZHEIMER'S DISEASE (HCC): ICD-10-CM

## 2025-06-26 DIAGNOSIS — I10 PRIMARY HYPERTENSION: Primary | ICD-10-CM

## 2025-06-26 DIAGNOSIS — G30.9 ALZHEIMER'S DISEASE (HCC): ICD-10-CM

## 2025-06-26 DIAGNOSIS — M47.815 OSTEOARTHRITIS OF THORACOLUMBAR SPINE, UNSPECIFIED SPINAL OSTEOARTHRITIS COMPLICATION STATUS: ICD-10-CM

## 2025-06-26 DIAGNOSIS — E03.9 HYPOTHYROIDISM, UNSPECIFIED TYPE: ICD-10-CM

## 2025-06-26 DIAGNOSIS — E78.5 HYPERLIPIDEMIA, UNSPECIFIED HYPERLIPIDEMIA TYPE: ICD-10-CM

## 2025-06-26 LAB
ANION GAP SERPL CALC-SCNC: 14 MMOL/L (ref 3–18)
BUN SERPL-MCNC: 17 MG/DL (ref 6–23)
BUN/CREAT SERPL: 19 (ref 12–20)
CALCIUM SERPL-MCNC: 9.3 MG/DL (ref 8.5–10.1)
CHLORIDE SERPL-SCNC: 98 MMOL/L (ref 98–107)
CO2 SERPL-SCNC: 23 MMOL/L (ref 21–32)
CREAT SERPL-MCNC: 0.93 MG/DL (ref 0.6–1.3)
GLUCOSE SERPL-MCNC: 105 MG/DL (ref 74–108)
POTASSIUM SERPL-SCNC: 3.6 MMOL/L (ref 3.5–5.5)
SODIUM SERPL-SCNC: 135 MMOL/L (ref 136–145)

## 2025-06-26 PROCEDURE — 1036F TOBACCO NON-USER: CPT | Performed by: STUDENT IN AN ORGANIZED HEALTH CARE EDUCATION/TRAINING PROGRAM

## 2025-06-26 PROCEDURE — 99214 OFFICE O/P EST MOD 30 MIN: CPT | Performed by: STUDENT IN AN ORGANIZED HEALTH CARE EDUCATION/TRAINING PROGRAM

## 2025-06-26 PROCEDURE — 36415 COLL VENOUS BLD VENIPUNCTURE: CPT

## 2025-06-26 PROCEDURE — 1090F PRES/ABSN URINE INCON ASSESS: CPT | Performed by: STUDENT IN AN ORGANIZED HEALTH CARE EDUCATION/TRAINING PROGRAM

## 2025-06-26 PROCEDURE — 1126F AMNT PAIN NOTED NONE PRSNT: CPT | Performed by: STUDENT IN AN ORGANIZED HEALTH CARE EDUCATION/TRAINING PROGRAM

## 2025-06-26 PROCEDURE — G2211 COMPLEX E/M VISIT ADD ON: HCPCS | Performed by: STUDENT IN AN ORGANIZED HEALTH CARE EDUCATION/TRAINING PROGRAM

## 2025-06-26 PROCEDURE — G8420 CALC BMI NORM PARAMETERS: HCPCS | Performed by: STUDENT IN AN ORGANIZED HEALTH CARE EDUCATION/TRAINING PROGRAM

## 2025-06-26 PROCEDURE — 1123F ACP DISCUSS/DSCN MKR DOCD: CPT | Performed by: STUDENT IN AN ORGANIZED HEALTH CARE EDUCATION/TRAINING PROGRAM

## 2025-06-26 PROCEDURE — 80048 BASIC METABOLIC PNL TOTAL CA: CPT

## 2025-06-26 PROCEDURE — 1159F MED LIST DOCD IN RCRD: CPT | Performed by: STUDENT IN AN ORGANIZED HEALTH CARE EDUCATION/TRAINING PROGRAM

## 2025-06-26 PROCEDURE — G8427 DOCREV CUR MEDS BY ELIG CLIN: HCPCS | Performed by: STUDENT IN AN ORGANIZED HEALTH CARE EDUCATION/TRAINING PROGRAM

## 2025-06-26 RX ORDER — CALCIUM CARBONATE/VITAMIN D3 600 MG-10
1 TABLET ORAL DAILY
Qty: 90 TABLET | Refills: 2 | Status: SHIPPED | OUTPATIENT
Start: 2025-06-26

## 2025-06-26 RX ORDER — CALCIUM CARBONATE/VITAMIN D3 600 MG-10
1 TABLET ORAL DAILY
Qty: 90 TABLET | Refills: 0 | Status: SHIPPED | OUTPATIENT
Start: 2025-06-26 | End: 2025-06-26

## 2025-06-26 ASSESSMENT — PATIENT HEALTH QUESTIONNAIRE - PHQ9
SUM OF ALL RESPONSES TO PHQ QUESTIONS 1-9: 1
2. FEELING DOWN, DEPRESSED OR HOPELESS: NOT AT ALL
SUM OF ALL RESPONSES TO PHQ QUESTIONS 1-9: 1
SUM OF ALL RESPONSES TO PHQ QUESTIONS 1-9: 1
1. LITTLE INTEREST OR PLEASURE IN DOING THINGS: SEVERAL DAYS
SUM OF ALL RESPONSES TO PHQ QUESTIONS 1-9: 1

## 2025-06-26 ASSESSMENT — ENCOUNTER SYMPTOMS
ABDOMINAL PAIN: 0
SHORTNESS OF BREATH: 0

## 2025-06-26 NOTE — PROGRESS NOTES
Zoraida Hilton (:  1935) is a 89 y.o. female, Established patient, here for evaluation of the following chief complaint(s):  Hypertension         Assessment & Plan  1.  Leg swelling  Non-pitting edema noted.  Treatment plan: Recommended compression stockings and elevating feet.    2. Alzheimer's disease  Upcoming neurology appointment 2025 at 3 PM. Currently taking donepezil 5 mg and trazodone 50 mg at night.  Diagnostic plan: Upcoming neurology appointment 2025 at 3 PM.  Treatment plan: Donepezil 5 mg and trazodone 50 mg at night. Discussed with daughter to have discussion with neurologist about considering mirtazapine if trazodone ineffective. Will inform via [employer] about additional supplements.    3. Hypertension  Taking amlodipine 5 mg  Treatment plan: Continue current dose of Amlodipine.    4. Myeloproliferative disorder  Under hematology care at [place] for neutropenia, follow-ups every 3 months.  Diagnostic plan: Follow-ups every 3 months.    5. Osteoporosis  Treatment plan: OTC vitamin D and calcium supplements. Prescription for combined vitamin D and calcium sent to pharmacy.    6. Chronic degenerative disc disease  Reports back pain, uses Tylenol PRN.  Treatment plan: Tylenol PRN.    7. Hyperthyroidism  Last TSH stable.   Treatment plan: Continue current dose of Synthroid.    8. Weight loss  Lost 13 pounds since 2025, BMI stable.  Diagnostic plan: Urine test today to rule out UTI. Kidney function and potassium levels to be checked today.  Treatment plan: Encouraged physical activities, weight training, senior center twice a week.    Follow-up: In 5 months.    Results  Labs   - Potassium levels: 2025, Low  1. Primary hypertension  2. Hyperlipidemia, unspecified hyperlipidemia type  3. Osteoarthritis of thoracolumbar spine, unspecified spinal osteoarthritis complication status  4. Osteoporosis, unspecified osteoporosis type, unspecified pathological fracture presence  -

## 2025-06-27 ENCOUNTER — RESULTS FOLLOW-UP (OUTPATIENT)
Facility: CLINIC | Age: 89
End: 2025-06-27

## 2025-08-18 ENCOUNTER — HOSPITAL ENCOUNTER (OUTPATIENT)
Facility: HOSPITAL | Age: 89
Discharge: HOME | End: 2025-08-21
Payer: MEDICARE

## 2025-08-18 DIAGNOSIS — G30.9 ALZHEIMER'S DISEASE (HCC): ICD-10-CM

## 2025-08-18 DIAGNOSIS — F02.80 ALZHEIMER'S DISEASE (HCC): ICD-10-CM

## 2025-08-18 LAB
APPEARANCE UR: ABNORMAL
BACTERIA URNS QL MICRO: ABNORMAL /HPF
BILIRUB UR QL: NEGATIVE
COLOR UR: ABNORMAL
EPITH CASTS URNS QL MICRO: ABNORMAL /LPF (ref 0–5)
GLUCOSE UR STRIP.AUTO-MCNC: NEGATIVE MG/DL
HGB UR QL STRIP: NEGATIVE
KETONES UR QL STRIP.AUTO: ABNORMAL MG/DL
LEUKOCYTE ESTERASE UR QL STRIP.AUTO: ABNORMAL
NITRITE UR QL STRIP.AUTO: NEGATIVE
PH UR STRIP: 5.5 (ref 5–8)
PROT UR STRIP-MCNC: ABNORMAL MG/DL
RBC #/AREA URNS HPF: NEGATIVE /HPF (ref 0–5)
SP GR UR REFRACTOMETRY: 1.02 (ref 1–1.03)
UROBILINOGEN UR QL STRIP.AUTO: 1 EU/DL (ref 0.2–1)
WBC URNS QL MICRO: ABNORMAL /HPF (ref 0–5)

## 2025-08-18 PROCEDURE — 36415 COLL VENOUS BLD VENIPUNCTURE: CPT

## 2025-08-18 PROCEDURE — 81001 URINALYSIS AUTO W/SCOPE: CPT

## 2025-08-19 RX ORDER — AMLODIPINE BESYLATE 5 MG/1
5 TABLET ORAL DAILY
Qty: 90 TABLET | Refills: 1 | Status: SHIPPED | OUTPATIENT
Start: 2025-08-19

## 2025-09-01 ENCOUNTER — APPOINTMENT (OUTPATIENT)
Facility: HOSPITAL | Age: 89
DRG: 312 | End: 2025-09-01
Payer: MEDICARE

## 2025-09-01 ENCOUNTER — HOSPITAL ENCOUNTER (INPATIENT)
Facility: HOSPITAL | Age: 89
LOS: 2 days | Discharge: HOME | DRG: 312 | End: 2025-09-03
Attending: EMERGENCY MEDICINE | Admitting: STUDENT IN AN ORGANIZED HEALTH CARE EDUCATION/TRAINING PROGRAM
Payer: MEDICARE

## 2025-09-01 DIAGNOSIS — R55 SYNCOPE, UNSPECIFIED SYNCOPE TYPE: Primary | ICD-10-CM

## 2025-09-01 DIAGNOSIS — R55 SYNCOPE AND COLLAPSE: ICD-10-CM

## 2025-09-01 LAB
ALBUMIN SERPL-MCNC: 3 G/DL (ref 3.4–5)
ALBUMIN/GLOB SERPL: 0.7 (ref 0.8–1.7)
ALP SERPL-CCNC: 78 U/L (ref 45–117)
ALT SERPL-CCNC: 15 U/L (ref 10–35)
ANION GAP SERPL CALC-SCNC: 13 MMOL/L (ref 3–18)
APPEARANCE UR: CLEAR
AST SERPL-CCNC: 21 U/L (ref 10–38)
BASOPHILS # BLD: 0.01 K/UL (ref 0–0.1)
BASOPHILS NFR BLD: 0.3 % (ref 0–2)
BILIRUB SERPL-MCNC: 0.3 MG/DL (ref 0.2–1)
BILIRUB UR QL: NEGATIVE
BUN SERPL-MCNC: 16 MG/DL (ref 6–23)
BUN/CREAT SERPL: 18 (ref 12–20)
CALCIUM SERPL-MCNC: 9.9 MG/DL (ref 8.5–10.1)
CHLORIDE SERPL-SCNC: 100 MMOL/L (ref 98–107)
CO2 SERPL-SCNC: 25 MMOL/L (ref 21–32)
COLOR UR: YELLOW
CREAT SERPL-MCNC: 0.9 MG/DL (ref 0.6–1.3)
D DIMER PPP FEU-MCNC: 0.57 UG/ML(FEU)
DIFFERENTIAL METHOD BLD: ABNORMAL
EOSINOPHIL # BLD: 0.02 K/UL (ref 0–0.4)
EOSINOPHIL NFR BLD: 0.6 % (ref 0–5)
ERYTHROCYTE [DISTWIDTH] IN BLOOD BY AUTOMATED COUNT: 15.5 % (ref 11.6–14.5)
GLOBULIN SER CALC-MCNC: 4.2 G/DL (ref 2–4)
GLUCOSE SERPL-MCNC: 129 MG/DL (ref 74–108)
GLUCOSE UR STRIP.AUTO-MCNC: NEGATIVE MG/DL
HCT VFR BLD AUTO: 34.7 % (ref 35–45)
HGB BLD-MCNC: 11 G/DL (ref 12–16)
HGB UR QL STRIP: NEGATIVE
IMM GRANULOCYTES # BLD AUTO: 0.05 K/UL (ref 0–0.04)
IMM GRANULOCYTES NFR BLD AUTO: 1.4 % (ref 0–0.5)
KETONES UR QL STRIP.AUTO: NEGATIVE MG/DL
LEUKOCYTE ESTERASE UR QL STRIP.AUTO: NEGATIVE
LYMPHOCYTES # BLD: 0.94 K/UL (ref 0.9–3.6)
LYMPHOCYTES NFR BLD: 27 % (ref 21–52)
MAGNESIUM SERPL-MCNC: 2.1 MG/DL (ref 1.6–2.6)
MCH RBC QN AUTO: 26.1 PG (ref 24–34)
MCHC RBC AUTO-ENTMCNC: 31.7 G/DL (ref 31–37)
MCV RBC AUTO: 82.2 FL (ref 78–100)
MONOCYTES # BLD: 0.43 K/UL (ref 0.05–1.2)
MONOCYTES NFR BLD: 12.4 % (ref 3–10)
NEUTS SEG # BLD: 2.03 K/UL (ref 1.8–8)
NEUTS SEG NFR BLD: 58.3 % (ref 40–73)
NITRITE UR QL STRIP.AUTO: NEGATIVE
NRBC # BLD: 0 K/UL (ref 0–0.01)
NRBC BLD-RTO: 0 PER 100 WBC
NT PRO BNP: 943 PG/ML (ref 36–1800)
PH UR STRIP: 8.5 (ref 5–8)
PLATELET # BLD AUTO: 268 K/UL (ref 135–420)
PMV BLD AUTO: 10.6 FL (ref 9.2–11.8)
POTASSIUM SERPL-SCNC: 4.2 MMOL/L (ref 3.5–5.5)
PROT SERPL-MCNC: 7.2 G/DL (ref 6.4–8.2)
PROT UR STRIP-MCNC: NEGATIVE MG/DL
RBC # BLD AUTO: 4.22 M/UL (ref 4.2–5.3)
SODIUM SERPL-SCNC: 138 MMOL/L (ref 136–145)
SP GR UR REFRACTOMETRY: 1.01 (ref 1–1.03)
TROPONIN T SERPL HS-MCNC: 14.7 NG/L (ref 0–14)
TROPONIN T SERPL HS-MCNC: 17.4 NG/L (ref 0–14)
UROBILINOGEN UR QL STRIP.AUTO: 0.2 EU/DL (ref 0.2–1)
WBC # BLD AUTO: 3.5 K/UL (ref 4.6–13.2)

## 2025-09-01 PROCEDURE — 70450 CT HEAD/BRAIN W/O DYE: CPT

## 2025-09-01 PROCEDURE — 94761 N-INVAS EAR/PLS OXIMETRY MLT: CPT

## 2025-09-01 PROCEDURE — 84484 ASSAY OF TROPONIN QUANT: CPT

## 2025-09-01 PROCEDURE — 83880 ASSAY OF NATRIURETIC PEPTIDE: CPT

## 2025-09-01 PROCEDURE — 85025 COMPLETE CBC W/AUTO DIFF WBC: CPT

## 2025-09-01 PROCEDURE — 81003 URINALYSIS AUTO W/O SCOPE: CPT

## 2025-09-01 PROCEDURE — 93005 ELECTROCARDIOGRAM TRACING: CPT | Performed by: EMERGENCY MEDICINE

## 2025-09-01 PROCEDURE — 83735 ASSAY OF MAGNESIUM: CPT

## 2025-09-01 PROCEDURE — 99223 1ST HOSP IP/OBS HIGH 75: CPT | Performed by: STUDENT IN AN ORGANIZED HEALTH CARE EDUCATION/TRAINING PROGRAM

## 2025-09-01 PROCEDURE — 85379 FIBRIN DEGRADATION QUANT: CPT

## 2025-09-01 PROCEDURE — 99285 EMERGENCY DEPT VISIT HI MDM: CPT

## 2025-09-01 PROCEDURE — 1100000000 HC RM PRIVATE

## 2025-09-01 PROCEDURE — 71045 X-RAY EXAM CHEST 1 VIEW: CPT

## 2025-09-01 PROCEDURE — 80053 COMPREHEN METABOLIC PANEL: CPT

## 2025-09-01 RX ORDER — SODIUM CHLORIDE 0.9 % (FLUSH) 0.9 %
5-40 SYRINGE (ML) INJECTION PRN
Status: DISCONTINUED | OUTPATIENT
Start: 2025-09-01 | End: 2025-09-03 | Stop reason: HOSPADM

## 2025-09-01 RX ORDER — SODIUM CHLORIDE 9 MG/ML
INJECTION, SOLUTION INTRAVENOUS PRN
Status: DISCONTINUED | OUTPATIENT
Start: 2025-09-01 | End: 2025-09-03 | Stop reason: HOSPADM

## 2025-09-01 RX ORDER — TRAZODONE HYDROCHLORIDE 50 MG/1
50 TABLET ORAL NIGHTLY PRN
Status: DISCONTINUED | OUTPATIENT
Start: 2025-09-01 | End: 2025-09-03 | Stop reason: HOSPADM

## 2025-09-01 RX ORDER — POTASSIUM CHLORIDE 7.45 MG/ML
10 INJECTION INTRAVENOUS PRN
Status: DISCONTINUED | OUTPATIENT
Start: 2025-09-01 | End: 2025-09-03 | Stop reason: HOSPADM

## 2025-09-01 RX ORDER — MAGNESIUM SULFATE IN WATER 40 MG/ML
2000 INJECTION, SOLUTION INTRAVENOUS PRN
Status: DISCONTINUED | OUTPATIENT
Start: 2025-09-01 | End: 2025-09-03 | Stop reason: HOSPADM

## 2025-09-01 RX ORDER — DONEPEZIL HYDROCHLORIDE 5 MG/1
5 TABLET, FILM COATED ORAL NIGHTLY
Status: DISCONTINUED | OUTPATIENT
Start: 2025-09-02 | End: 2025-09-03 | Stop reason: HOSPADM

## 2025-09-01 RX ORDER — LEVOTHYROXINE SODIUM 25 UG/1
25 TABLET ORAL
Status: DISCONTINUED | OUTPATIENT
Start: 2025-09-02 | End: 2025-09-03 | Stop reason: HOSPADM

## 2025-09-01 RX ORDER — ACETAMINOPHEN 325 MG/1
650 TABLET ORAL EVERY 6 HOURS PRN
Status: DISCONTINUED | OUTPATIENT
Start: 2025-09-01 | End: 2025-09-03 | Stop reason: HOSPADM

## 2025-09-01 RX ORDER — POTASSIUM CHLORIDE 1500 MG/1
40 TABLET, EXTENDED RELEASE ORAL PRN
Status: DISCONTINUED | OUTPATIENT
Start: 2025-09-01 | End: 2025-09-03 | Stop reason: HOSPADM

## 2025-09-01 RX ORDER — ACETAMINOPHEN 650 MG/1
650 SUPPOSITORY RECTAL EVERY 6 HOURS PRN
Status: DISCONTINUED | OUTPATIENT
Start: 2025-09-01 | End: 2025-09-03 | Stop reason: HOSPADM

## 2025-09-01 RX ORDER — ONDANSETRON 4 MG/1
4 TABLET, ORALLY DISINTEGRATING ORAL EVERY 8 HOURS PRN
Status: DISCONTINUED | OUTPATIENT
Start: 2025-09-01 | End: 2025-09-03 | Stop reason: HOSPADM

## 2025-09-01 RX ORDER — AMLODIPINE BESYLATE 5 MG/1
5 TABLET ORAL DAILY
Status: DISCONTINUED | OUTPATIENT
Start: 2025-09-02 | End: 2025-09-03 | Stop reason: HOSPADM

## 2025-09-01 RX ORDER — POLYETHYLENE GLYCOL 3350 17 G/17G
17 POWDER, FOR SOLUTION ORAL DAILY PRN
Status: DISCONTINUED | OUTPATIENT
Start: 2025-09-01 | End: 2025-09-03 | Stop reason: HOSPADM

## 2025-09-01 RX ORDER — ONDANSETRON 2 MG/ML
4 INJECTION INTRAMUSCULAR; INTRAVENOUS EVERY 6 HOURS PRN
Status: DISCONTINUED | OUTPATIENT
Start: 2025-09-01 | End: 2025-09-03 | Stop reason: HOSPADM

## 2025-09-01 RX ORDER — SODIUM CHLORIDE 0.9 % (FLUSH) 0.9 %
5-40 SYRINGE (ML) INJECTION EVERY 12 HOURS SCHEDULED
Status: DISCONTINUED | OUTPATIENT
Start: 2025-09-02 | End: 2025-09-03 | Stop reason: HOSPADM

## 2025-09-01 ASSESSMENT — PAIN SCALES - GENERAL: PAINLEVEL_OUTOF10: 0

## 2025-09-02 ENCOUNTER — APPOINTMENT (OUTPATIENT)
Facility: HOSPITAL | Age: 89
DRG: 312 | End: 2025-09-02
Payer: MEDICARE

## 2025-09-02 ENCOUNTER — APPOINTMENT (OUTPATIENT)
Facility: HOSPITAL | Age: 89
DRG: 312 | End: 2025-09-02
Attending: STUDENT IN AN ORGANIZED HEALTH CARE EDUCATION/TRAINING PROGRAM
Payer: MEDICARE

## 2025-09-02 ENCOUNTER — HOSPITAL ENCOUNTER (INPATIENT)
Facility: HOSPITAL | Age: 89
Discharge: HOME | DRG: 312 | End: 2025-09-05
Payer: MEDICARE

## 2025-09-02 LAB
B PERT DNA SPEC QL NAA+PROBE: NOT DETECTED
BORDETELLA PARAPERTUSSIS BY PCR: NOT DETECTED
C PNEUM DNA SPEC QL NAA+PROBE: NOT DETECTED
ECHO AO ASC DIAM: 2.8 CM
ECHO AO ASCENDING AORTA INDEX: 1.71 CM/M2
ECHO AO ROOT DIAM: 3.1 CM
ECHO AO ROOT INDEX: 1.89 CM/M2
ECHO AR MAX VEL PISA: 3.6 M/S
ECHO AV PEAK GRADIENT: 6 MMHG
ECHO AV PEAK VELOCITY: 1.2 M/S
ECHO AV REGURGITANT PHT: 507 MS
ECHO AV VELOCITY RATIO: 0.83
ECHO BSA: 1.64 M2
ECHO BSA: 1.64 M2
ECHO EST RA PRESSURE: 3 MMHG
ECHO IVC PROX: 1 CM
ECHO LA DIAMETER INDEX: 2.2 CM/M2
ECHO LA DIAMETER: 3.6 CM
ECHO LA TO AORTIC ROOT RATIO: 1.16
ECHO LA VOL A-L A2C: 31 ML (ref 22–52)
ECHO LA VOL A-L A4C: 31 ML (ref 22–52)
ECHO LA VOL BP: 30 ML (ref 22–52)
ECHO LA VOL MOD A2C: 30 ML (ref 22–52)
ECHO LA VOL MOD A4C: 29 ML (ref 22–52)
ECHO LA VOL/BSA BIPLANE: 18 ML/M2 (ref 16–34)
ECHO LA VOLUME AREA LENGTH: 31 ML
ECHO LA VOLUME INDEX A-L A2C: 19 ML/M2 (ref 16–34)
ECHO LA VOLUME INDEX A-L A4C: 19 ML/M2 (ref 16–34)
ECHO LA VOLUME INDEX AREA LENGTH: 19 ML/M2 (ref 16–34)
ECHO LA VOLUME INDEX MOD A2C: 18 ML/M2 (ref 16–34)
ECHO LA VOLUME INDEX MOD A4C: 18 ML/M2 (ref 16–34)
ECHO LV E' LATERAL VELOCITY: 8.77 CM/S
ECHO LV E' SEPTAL VELOCITY: 5.9 CM/S
ECHO LV EF PHYSICIAN: 60 %
ECHO LV FRACTIONAL SHORTENING: 50 % (ref 28–44)
ECHO LV INTERNAL DIMENSION DIASTOLE INDEX: 2.8 CM/M2
ECHO LV INTERNAL DIMENSION DIASTOLIC: 4.6 CM (ref 3.9–5.3)
ECHO LV INTERNAL DIMENSION SYSTOLIC INDEX: 1.4 CM/M2
ECHO LV INTERNAL DIMENSION SYSTOLIC: 2.3 CM
ECHO LV IVSD: 0.8 CM (ref 0.6–0.9)
ECHO LV MASS 2D: 137.7 G (ref 67–162)
ECHO LV MASS INDEX 2D: 84 G/M2 (ref 43–95)
ECHO LV POSTERIOR WALL DIASTOLIC: 1 CM (ref 0.6–0.9)
ECHO LV RELATIVE WALL THICKNESS RATIO: 0.43
ECHO LVOT PEAK GRADIENT: 4 MMHG
ECHO LVOT PEAK VELOCITY: 1 M/S
ECHO MV A VELOCITY: 0.83 M/S
ECHO MV E DECELERATION TIME (DT): 284.5 MS
ECHO MV E VELOCITY: 0.43 M/S
ECHO MV E/A RATIO: 0.52
ECHO MV E/E' LATERAL: 4.9
ECHO MV E/E' RATIO (AVERAGED): 6.1
ECHO MV E/E' SEPTAL: 7.29
ECHO PV MAX VELOCITY: 0.8 M/S
ECHO PV PEAK GRADIENT: 2 MMHG
ECHO RA VOLUME BIPLANE METHOD OF DISKS: 23 ML
ECHO RA VOLUME INDEX BP: 14 ML/M2
ECHO RIGHT VENTRICULAR SYSTOLIC PRESSURE (RVSP): 29 MMHG
ECHO RV BASAL DIMENSION: 3 CM
ECHO RV FREE WALL PEAK S': 14.9 CM/S
ECHO RV TAPSE: 1.8 CM (ref 1.7–?)
ECHO TV REGURGITANT MAX VELOCITY: 2.53 M/S
ECHO TV REGURGITANT PEAK GRADIENT: 26 MMHG
EKG ATRIAL RATE: 59 BPM
EKG DIAGNOSIS: NORMAL
EKG Q-T INTERVAL: 446 MS
EKG QRS DURATION: 72 MS
EKG QTC CALCULATION (BAZETT): 437 MS
EKG R AXIS: 16 DEGREES
EKG T AXIS: 25 DEGREES
EKG VENTRICULAR RATE: 58 BPM
FLUAV SUBTYP SPEC NAA+PROBE: NOT DETECTED
FLUBV RNA SPEC QL NAA+PROBE: NOT DETECTED
HADV DNA SPEC QL NAA+PROBE: NOT DETECTED
HCOV 229E RNA SPEC QL NAA+PROBE: NOT DETECTED
HCOV HKU1 RNA SPEC QL NAA+PROBE: NOT DETECTED
HCOV NL63 RNA SPEC QL NAA+PROBE: NOT DETECTED
HCOV OC43 RNA SPEC QL NAA+PROBE: NOT DETECTED
HMPV RNA SPEC QL NAA+PROBE: NOT DETECTED
HPIV1 RNA SPEC QL NAA+PROBE: NOT DETECTED
HPIV2 RNA SPEC QL NAA+PROBE: NOT DETECTED
HPIV3 RNA SPEC QL NAA+PROBE: NOT DETECTED
HPIV4 RNA SPEC QL NAA+PROBE: NOT DETECTED
M PNEUMO DNA SPEC QL NAA+PROBE: NOT DETECTED
NUC STRESS EJECTION FRACTION: 81 %
RSV RNA SPEC QL NAA+PROBE: NOT DETECTED
RV+EV RNA SPEC QL NAA+PROBE: NOT DETECTED
SARS-COV-2 RNA RESP QL NAA+PROBE: NOT DETECTED
STRESS BASELINE DIAS BP: 68 MMHG
STRESS BASELINE HR: 60 BPM
STRESS BASELINE ST DEPRESSION: 0 MM
STRESS BASELINE SYS BP: 152 MMHG
STRESS ESTIMATED WORKLOAD: 1 METS
STRESS PEAK DIAS BP: 68 MMHG
STRESS PEAK SYS BP: 152 MMHG
STRESS PERCENT HR ACHIEVED: 64 %
STRESS POST PEAK HR: 85 BPM
STRESS RATE PRESSURE PRODUCT: NORMAL BPM*MMHG
STRESS TARGET HR: 132 BPM
TID: 1.08

## 2025-09-02 PROCEDURE — 2500000003 HC RX 250 WO HCPCS: Performed by: STUDENT IN AN ORGANIZED HEALTH CARE EDUCATION/TRAINING PROGRAM

## 2025-09-02 PROCEDURE — 93017 CV STRESS TEST TRACING ONLY: CPT

## 2025-09-02 PROCEDURE — 78452 HT MUSCLE IMAGE SPECT MULT: CPT | Performed by: INTERNAL MEDICINE

## 2025-09-02 PROCEDURE — 93010 ELECTROCARDIOGRAM REPORT: CPT | Performed by: INTERNAL MEDICINE

## 2025-09-02 PROCEDURE — 97162 PT EVAL MOD COMPLEX 30 MIN: CPT

## 2025-09-02 PROCEDURE — 78452 HT MUSCLE IMAGE SPECT MULT: CPT

## 2025-09-02 PROCEDURE — 97116 GAIT TRAINING THERAPY: CPT

## 2025-09-02 PROCEDURE — 93306 TTE W/DOPPLER COMPLETE: CPT

## 2025-09-02 PROCEDURE — 3430000000 HC RX DIAGNOSTIC RADIOPHARMACEUTICAL: Performed by: STUDENT IN AN ORGANIZED HEALTH CARE EDUCATION/TRAINING PROGRAM

## 2025-09-02 PROCEDURE — 6370000000 HC RX 637 (ALT 250 FOR IP): Performed by: STUDENT IN AN ORGANIZED HEALTH CARE EDUCATION/TRAINING PROGRAM

## 2025-09-02 PROCEDURE — 93016 CV STRESS TEST SUPVJ ONLY: CPT | Performed by: INTERNAL MEDICINE

## 2025-09-02 PROCEDURE — 94761 N-INVAS EAR/PLS OXIMETRY MLT: CPT

## 2025-09-02 PROCEDURE — 93306 TTE W/DOPPLER COMPLETE: CPT | Performed by: INTERNAL MEDICINE

## 2025-09-02 PROCEDURE — 0202U NFCT DS 22 TRGT SARS-COV-2: CPT

## 2025-09-02 PROCEDURE — 99222 1ST HOSP IP/OBS MODERATE 55: CPT | Performed by: INTERNAL MEDICINE

## 2025-09-02 PROCEDURE — 93018 CV STRESS TEST I&R ONLY: CPT | Performed by: INTERNAL MEDICINE

## 2025-09-02 PROCEDURE — 2580000003 HC RX 258: Performed by: PHYSICIAN ASSISTANT

## 2025-09-02 PROCEDURE — 6360000002 HC RX W HCPCS: Performed by: STUDENT IN AN ORGANIZED HEALTH CARE EDUCATION/TRAINING PROGRAM

## 2025-09-02 PROCEDURE — 1100000000 HC RM PRIVATE

## 2025-09-02 PROCEDURE — 99233 SBSQ HOSP IP/OBS HIGH 50: CPT | Performed by: STUDENT IN AN ORGANIZED HEALTH CARE EDUCATION/TRAINING PROGRAM

## 2025-09-02 PROCEDURE — A9502 TC99M TETROFOSMIN: HCPCS | Performed by: STUDENT IN AN ORGANIZED HEALTH CARE EDUCATION/TRAINING PROGRAM

## 2025-09-02 RX ORDER — DEXTROSE MONOHYDRATE AND SODIUM CHLORIDE 5; .45 G/100ML; G/100ML
INJECTION, SOLUTION INTRAVENOUS CONTINUOUS
Status: DISPENSED | OUTPATIENT
Start: 2025-09-02 | End: 2025-09-03

## 2025-09-02 RX ORDER — HYDRALAZINE HYDROCHLORIDE 20 MG/ML
10 INJECTION INTRAMUSCULAR; INTRAVENOUS EVERY 6 HOURS PRN
Status: DISCONTINUED | OUTPATIENT
Start: 2025-09-02 | End: 2025-09-03 | Stop reason: HOSPADM

## 2025-09-02 RX ORDER — REGADENOSON 0.08 MG/ML
0.4 INJECTION, SOLUTION INTRAVENOUS
Status: COMPLETED | OUTPATIENT
Start: 2025-09-02 | End: 2025-09-02

## 2025-09-02 RX ORDER — HEPARIN SODIUM 5000 [USP'U]/ML
5000 INJECTION, SOLUTION INTRAVENOUS; SUBCUTANEOUS EVERY 8 HOURS SCHEDULED
Status: DISCONTINUED | OUTPATIENT
Start: 2025-09-02 | End: 2025-09-03 | Stop reason: HOSPADM

## 2025-09-02 RX ORDER — BENZONATATE 100 MG/1
200 CAPSULE ORAL 3 TIMES DAILY PRN
Status: DISCONTINUED | OUTPATIENT
Start: 2025-09-02 | End: 2025-09-03 | Stop reason: HOSPADM

## 2025-09-02 RX ADMIN — SODIUM CHLORIDE, PRESERVATIVE FREE 10 ML: 5 INJECTION INTRAVENOUS at 08:52

## 2025-09-02 RX ADMIN — LEVOTHYROXINE SODIUM 25 MCG: 0.03 TABLET ORAL at 08:47

## 2025-09-02 RX ADMIN — HEPARIN SODIUM 5000 UNITS: 5000 INJECTION, SOLUTION INTRAVENOUS; SUBCUTANEOUS at 15:43

## 2025-09-02 RX ADMIN — AMLODIPINE BESYLATE 5 MG: 5 TABLET ORAL at 08:47

## 2025-09-02 RX ADMIN — TETROFOSMIN 33 MILLICURIE: 1.38 INJECTION, POWDER, LYOPHILIZED, FOR SOLUTION INTRAVENOUS at 11:29

## 2025-09-02 RX ADMIN — SODIUM CHLORIDE, PRESERVATIVE FREE 10 ML: 5 INJECTION INTRAVENOUS at 21:10

## 2025-09-02 RX ADMIN — DONEPEZIL HYDROCHLORIDE 5 MG: 5 TABLET, FILM COATED ORAL at 01:35

## 2025-09-02 RX ADMIN — DEXTROSE AND SODIUM CHLORIDE: 5; .45 INJECTION, SOLUTION INTRAVENOUS at 21:09

## 2025-09-02 RX ADMIN — DONEPEZIL HYDROCHLORIDE 5 MG: 5 TABLET, FILM COATED ORAL at 21:09

## 2025-09-02 RX ADMIN — HEPARIN SODIUM 5000 UNITS: 5000 INJECTION, SOLUTION INTRAVENOUS; SUBCUTANEOUS at 22:02

## 2025-09-02 RX ADMIN — REGADENOSON 0.4 MG: 0.08 INJECTION, SOLUTION INTRAVENOUS at 11:29

## 2025-09-02 RX ADMIN — TRAZODONE HYDROCHLORIDE 50 MG: 50 TABLET ORAL at 21:09

## 2025-09-02 RX ADMIN — TETROFOSMIN 11 MILLICURIE: 1.38 INJECTION, POWDER, LYOPHILIZED, FOR SOLUTION INTRAVENOUS at 08:30

## 2025-09-02 RX ADMIN — TRAZODONE HYDROCHLORIDE 50 MG: 50 TABLET ORAL at 01:35

## 2025-09-02 ASSESSMENT — PAIN SCALES - GENERAL
PAINLEVEL_OUTOF10: 0

## 2025-09-03 VITALS
SYSTOLIC BLOOD PRESSURE: 132 MMHG | HEIGHT: 64 IN | BODY MASS INDEX: 22.53 KG/M2 | RESPIRATION RATE: 17 BRPM | TEMPERATURE: 97.7 F | OXYGEN SATURATION: 100 % | HEART RATE: 61 BPM | WEIGHT: 132 LBS | DIASTOLIC BLOOD PRESSURE: 63 MMHG

## 2025-09-03 LAB
ANION GAP SERPL CALC-SCNC: 12 MMOL/L (ref 3–18)
BASOPHILS # BLD: 0 K/UL (ref 0–0.1)
BASOPHILS NFR BLD: 0 % (ref 0–2)
BUN SERPL-MCNC: 9 MG/DL (ref 6–23)
BUN/CREAT SERPL: 11 (ref 12–20)
CALCIUM SERPL-MCNC: 9 MG/DL (ref 8.5–10.1)
CHLORIDE SERPL-SCNC: 100 MMOL/L (ref 98–107)
CO2 SERPL-SCNC: 22 MMOL/L (ref 21–32)
CREAT SERPL-MCNC: 0.8 MG/DL (ref 0.6–1.3)
DIFFERENTIAL METHOD BLD: ABNORMAL
EOSINOPHIL # BLD: 0.04 K/UL (ref 0–0.4)
EOSINOPHIL NFR BLD: 1.2 % (ref 0–5)
ERYTHROCYTE [DISTWIDTH] IN BLOOD BY AUTOMATED COUNT: 15.5 % (ref 11.6–14.5)
FOLATE SERPL-MCNC: 13.7 NG/ML (ref 4.6–34.8)
GLUCOSE SERPL-MCNC: 93 MG/DL (ref 74–108)
HCT VFR BLD AUTO: 31.2 % (ref 35–45)
HGB BLD-MCNC: 10.2 G/DL (ref 12–16)
IMM GRANULOCYTES # BLD AUTO: 0.04 K/UL (ref 0–0.04)
IMM GRANULOCYTES NFR BLD AUTO: 1.2 % (ref 0–0.5)
LYMPHOCYTES # BLD: 1.22 K/UL (ref 0.9–3.6)
LYMPHOCYTES NFR BLD: 36.2 % (ref 21–52)
MAGNESIUM SERPL-MCNC: 2.3 MG/DL (ref 1.6–2.6)
MCH RBC QN AUTO: 26.4 PG (ref 24–34)
MCHC RBC AUTO-ENTMCNC: 32.7 G/DL (ref 31–37)
MCV RBC AUTO: 80.6 FL (ref 78–100)
MONOCYTES # BLD: 0.41 K/UL (ref 0.05–1.2)
MONOCYTES NFR BLD: 12.2 % (ref 3–10)
NEUTS SEG # BLD: 1.66 K/UL (ref 1.8–8)
NEUTS SEG NFR BLD: 49.2 % (ref 40–73)
NRBC # BLD: 0 K/UL (ref 0–0.01)
NRBC BLD-RTO: 0 PER 100 WBC
PLATELET # BLD AUTO: 240 K/UL (ref 135–420)
PMV BLD AUTO: 11.5 FL (ref 9.2–11.8)
POTASSIUM SERPL-SCNC: 3.1 MMOL/L (ref 3.5–5.5)
RBC # BLD AUTO: 3.87 M/UL (ref 4.2–5.3)
SODIUM SERPL-SCNC: 134 MMOL/L (ref 136–145)
T4 FREE SERPL-MCNC: 1.3 NG/DL (ref 0.9–1.7)
TSH, 3RD GENERATION: 6.22 UIU/ML (ref 0.27–4.2)
VIT B12 SERPL-MCNC: 386 PG/ML (ref 211–911)
WBC # BLD AUTO: 3.4 K/UL (ref 4.6–13.2)

## 2025-09-03 PROCEDURE — 97535 SELF CARE MNGMENT TRAINING: CPT

## 2025-09-03 PROCEDURE — 94761 N-INVAS EAR/PLS OXIMETRY MLT: CPT

## 2025-09-03 PROCEDURE — 83735 ASSAY OF MAGNESIUM: CPT

## 2025-09-03 PROCEDURE — 6370000000 HC RX 637 (ALT 250 FOR IP): Performed by: STUDENT IN AN ORGANIZED HEALTH CARE EDUCATION/TRAINING PROGRAM

## 2025-09-03 PROCEDURE — 36415 COLL VENOUS BLD VENIPUNCTURE: CPT

## 2025-09-03 PROCEDURE — 2500000003 HC RX 250 WO HCPCS: Performed by: STUDENT IN AN ORGANIZED HEALTH CARE EDUCATION/TRAINING PROGRAM

## 2025-09-03 PROCEDURE — 82607 VITAMIN B-12: CPT

## 2025-09-03 PROCEDURE — 84439 ASSAY OF FREE THYROXINE: CPT

## 2025-09-03 PROCEDURE — 6360000002 HC RX W HCPCS: Performed by: STUDENT IN AN ORGANIZED HEALTH CARE EDUCATION/TRAINING PROGRAM

## 2025-09-03 PROCEDURE — 92610 EVALUATE SWALLOWING FUNCTION: CPT

## 2025-09-03 PROCEDURE — 80048 BASIC METABOLIC PNL TOTAL CA: CPT

## 2025-09-03 PROCEDURE — 92526 ORAL FUNCTION THERAPY: CPT

## 2025-09-03 PROCEDURE — 99239 HOSP IP/OBS DSCHRG MGMT >30: CPT | Performed by: STUDENT IN AN ORGANIZED HEALTH CARE EDUCATION/TRAINING PROGRAM

## 2025-09-03 PROCEDURE — 85025 COMPLETE CBC W/AUTO DIFF WBC: CPT

## 2025-09-03 PROCEDURE — 84443 ASSAY THYROID STIM HORMONE: CPT

## 2025-09-03 PROCEDURE — 82746 ASSAY OF FOLIC ACID SERUM: CPT

## 2025-09-03 PROCEDURE — 97165 OT EVAL LOW COMPLEX 30 MIN: CPT

## 2025-09-03 RX ORDER — CARVEDILOL 3.12 MG/1
6.25 TABLET ORAL 2 TIMES DAILY
Qty: 120 TABLET | Refills: 0 | Status: SHIPPED | OUTPATIENT
Start: 2025-09-03

## 2025-09-03 RX ORDER — ASPIRIN 81 MG/1
81 TABLET ORAL DAILY
Qty: 30 TABLET | Refills: 0 | Status: SHIPPED | OUTPATIENT
Start: 2025-09-03

## 2025-09-03 RX ORDER — PRAVASTATIN SODIUM 20 MG
10 TABLET ORAL EVERY EVENING
Qty: 15 TABLET | Refills: 3 | Status: SHIPPED | OUTPATIENT
Start: 2025-09-03

## 2025-09-03 RX ADMIN — SODIUM CHLORIDE, PRESERVATIVE FREE 10 ML: 5 INJECTION INTRAVENOUS at 07:51

## 2025-09-03 RX ADMIN — LEVOTHYROXINE SODIUM 25 MCG: 0.03 TABLET ORAL at 05:55

## 2025-09-03 RX ADMIN — HEPARIN SODIUM 5000 UNITS: 5000 INJECTION, SOLUTION INTRAVENOUS; SUBCUTANEOUS at 05:54

## 2025-09-03 ASSESSMENT — PAIN SCALES - GENERAL
PAINLEVEL_OUTOF10: 0

## 2025-09-04 ENCOUNTER — TELEPHONE (OUTPATIENT)
Facility: CLINIC | Age: 89
End: 2025-09-04

## 2025-09-04 ENCOUNTER — CLINICAL DOCUMENTATION (OUTPATIENT)
Facility: CLINIC | Age: 89
End: 2025-09-04